# Patient Record
Sex: FEMALE | Race: WHITE | NOT HISPANIC OR LATINO | Employment: OTHER | URBAN - METROPOLITAN AREA
[De-identification: names, ages, dates, MRNs, and addresses within clinical notes are randomized per-mention and may not be internally consistent; named-entity substitution may affect disease eponyms.]

---

## 2017-04-22 ENCOUNTER — HOSPITAL ENCOUNTER (OUTPATIENT)
Dept: SLEEP MEDICINE | Facility: HOSPITAL | Age: 67
Discharge: HOME | End: 2017-04-22
Attending: PSYCHIATRY & NEUROLOGY | Admitting: PSYCHIATRY & NEUROLOGY

## 2017-05-04 ENCOUNTER — HOSPITAL ENCOUNTER (OUTPATIENT)
Dept: SLEEP MEDICINE | Facility: HOSPITAL | Age: 67
Discharge: HOME | End: 2017-05-04
Attending: PSYCHIATRY & NEUROLOGY | Admitting: PSYCHIATRY & NEUROLOGY

## 2017-06-08 ENCOUNTER — HOSPITAL ENCOUNTER (OUTPATIENT)
Dept: SLEEP MEDICINE | Facility: HOSPITAL | Age: 67
Discharge: HOME | End: 2017-06-08
Attending: PSYCHIATRY & NEUROLOGY | Admitting: PSYCHIATRY & NEUROLOGY

## 2017-07-24 ENCOUNTER — HOSPITAL ENCOUNTER (OUTPATIENT)
Dept: GENERAL RADIOLOGY | Facility: HOSPITAL | Age: 67
Discharge: HOME | End: 2017-07-24
Attending: OTOLARYNGOLOGY | Admitting: OTOLARYNGOLOGY

## 2019-07-24 RX ORDER — DIGOXIN 250 UG/1
TABLET ORAL
Qty: 30 TABLET | Refills: 5 | Status: SHIPPED | OUTPATIENT
Start: 2019-07-24 | End: 2020-01-27

## 2019-09-17 ENCOUNTER — OFFICE VISIT (OUTPATIENT)
Dept: CARDIOLOGY | Facility: CLINIC | Age: 69
End: 2019-09-17

## 2019-09-17 VITALS
HEART RATE: 69 BPM | WEIGHT: 286 LBS | HEIGHT: 69 IN | DIASTOLIC BLOOD PRESSURE: 89 MMHG | BODY MASS INDEX: 42.36 KG/M2 | SYSTOLIC BLOOD PRESSURE: 133 MMHG | OXYGEN SATURATION: 98 %

## 2019-09-17 DIAGNOSIS — Z79.01 ANTICOAGULATION MANAGEMENT ENCOUNTER: ICD-10-CM

## 2019-09-17 DIAGNOSIS — I48.3 TYPICAL ATRIAL FLUTTER (HCC): Primary | ICD-10-CM

## 2019-09-17 DIAGNOSIS — Z51.81 ANTICOAGULATION MANAGEMENT ENCOUNTER: ICD-10-CM

## 2019-09-17 DIAGNOSIS — I10 ESSENTIAL HYPERTENSION: ICD-10-CM

## 2019-09-17 DIAGNOSIS — Z01.810 PREOPERATIVE CARDIOVASCULAR EXAMINATION: ICD-10-CM

## 2019-09-17 PROBLEM — IMO0002 EXCESSIVE ANTICOAGULATION: Status: ACTIVE | Noted: 2018-08-21

## 2019-09-17 PROCEDURE — 93000 ELECTROCARDIOGRAM COMPLETE: CPT | Performed by: INTERNAL MEDICINE

## 2019-09-17 PROCEDURE — 99214 OFFICE O/P EST MOD 30 MIN: CPT | Performed by: INTERNAL MEDICINE

## 2019-09-17 RX ORDER — BUPROPION HYDROCHLORIDE 300 MG/1
300 TABLET ORAL EVERY MORNING
COMMUNITY
Start: 2019-08-04 | End: 2023-03-07 | Stop reason: ALTCHOICE

## 2019-09-17 RX ORDER — RIVAROXABAN 20 MG/1
TABLET, FILM COATED ORAL
COMMUNITY
Start: 2019-06-24 | End: 2019-09-17 | Stop reason: SDUPTHER

## 2019-09-17 RX ORDER — BUDESONIDE AND FORMOTEROL FUMARATE DIHYDRATE 160; 4.5 UG/1; UG/1
2 AEROSOL RESPIRATORY (INHALATION) 2 TIMES DAILY PRN
COMMUNITY
Start: 2017-09-19 | End: 2022-02-24

## 2019-09-17 RX ORDER — PROPAFENONE HYDROCHLORIDE 225 MG/1
TABLET, FILM COATED ORAL EVERY 12 HOURS
COMMUNITY
Start: 2018-01-17 | End: 2021-05-19

## 2019-09-17 RX ORDER — ASPIRIN 81 MG/1
81 TABLET ORAL DAILY
COMMUNITY
Start: 2017-06-19 | End: 2021-06-21

## 2019-09-17 RX ORDER — VENLAFAXINE HYDROCHLORIDE 75 MG/1
225 CAPSULE, EXTENDED RELEASE ORAL DAILY
COMMUNITY
Start: 2019-08-04

## 2019-09-17 RX ORDER — RIVAROXABAN 20 MG/1
20 TABLET, FILM COATED ORAL
Qty: 28 TABLET | Refills: 0 | COMMUNITY
Start: 2019-09-17 | End: 2020-02-28

## 2019-09-17 NOTE — PROGRESS NOTES
Encounter Date:09/17/2019      Patient ID: Rosanne Hutchins is a 68 y.o. female.    Chief Complaint:  Preoperative cardiovascular evaluation prior to having possible lipoma removal.  Atrial fibrillation  Hypertension  Anticoagulation management.      History of Present Illness    Patient has a lipoma on the back of the neck.  Patient is considering having lipoma removal.    Since I have last seen, the patient has been without any chest discomfort ,shortness of breath, palpitations, dizziness or syncope.  Denies having any headache ,abdominal pain ,nausea, vomiting , diarrhea constipation, loss of weight or loss of appetite.  Denies having any excessive bruising ,hematuria or blood in the stool.    Review of all systems negative except as indicated  Assessment and Plan       /////////////////   impression  ===============   -Intermittent atrial fibrillation.  patient is asymptomatic.  Patient does not feel like she is in atrial fibrillation   Patient has converted and Was maintaining sinus rhythm in the past.Status post attempted cardioversion 04/15/2016.  Patient would convert but would not stay in sinus rhythm.  Patient is in atrial fibrillation today.    -anticoagulation - patient is on Xarelto      -hypertension-well controlled    - sleep apnea on BiPAP     -history of mild elevation of creatinine at 1. 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     -exogenous obesity.  Patient has lost significant weight    - post hysterectomy and bilateral salpingo-oophorectomy.  ==================   Plan  ============  EKG showed atrial fibrillation with controlled ventricular response   patient is asymptomatic .    Would  not make attempts to convert since patient is  asymptomatic.  I have briefly discussed with her about ablation however I did not think it is necessary at this time since patient is asymptomatic with atrial fibrillation and is well anticoagulated.  At this point I did not see any role for Watchman  procedure  Patient was asked to continue Rythmol to 225 mg   Twice a day and continue Xarelto 20 mg a day.  Continue Lanoxin.  Anticoagulation status was reviewed.    Continue Xarelto   Medications were reviewed and updated.  Okay with plan lipoma surgery if necessary.  Xarelto can be held for 4 days prior to the procedure.  Patient was provided with supporting document  Followup in the office in 6 months with EKG.  ////////////////////////           Diagnosis Plan   1. Typical atrial flutter (CMS/HCC)  ECG 12 Lead   2. Essential hypertension  ECG 12 Lead   3. Anticoagulation management encounter  ECG 12 Lead   4. Preoperative cardiovascular examination  ECG 12 Lead   LAB RESULTS (LAST 7 DAYS)    CBC        BMP        CMP         BNP        TROPONIN        CoAg        Creatinine Clearance  CrCl cannot be calculated (No order found.).    ABG        Radiology  No radiology results for the last day                The following portions of the patient's history were reviewed and updated as appropriate: allergies, current medications, past family history, past medical history, past social history, past surgical history and problem list.    Review of Systems   Constitution: Negative for malaise/fatigue.   Cardiovascular: Negative for chest pain, leg swelling, palpitations and syncope.   Respiratory: Negative for shortness of breath.    Skin: Negative for rash.   Gastrointestinal: Negative for nausea and vomiting.   Neurological: Positive for dizziness. Negative for light-headedness and numbness.         Current Outpatient Medications:   •  aspirin (ASPIR-LOW) 81 MG EC tablet, ASPIR-LOW 81 MG TBEC, Disp: , Rfl:   •  budesonide-formoterol (SYMBICORT) 160-4.5 MCG/ACT inhaler, SYMBICORT 160-4.5 MCG/ACT AERO, Disp: , Rfl:   •  buPROPion XL (WELLBUTRIN XL) 300 MG 24 hr tablet, , Disp: , Rfl:   •  DIGOX 250 MCG tablet, TAKE ONE TABLET BY MOUTH DAILY, Disp: 30 tablet, Rfl: 5  •  propafenone (RYTHMOL) 225 MG tablet, Every 12  "(Twelve) Hours., Disp: , Rfl:   •  venlafaxine XR (EFFEXOR-XR) 75 MG 24 hr capsule, , Disp: , Rfl:   •  XARELTO 20 MG tablet, Take 1 tablet by mouth Daily With Dinner., Disp: 28 tablet, Rfl: 0    No Known Allergies    Family History   Problem Relation Age of Onset   • Diabetes Mother    • Hypertension Mother    • Heart disease Mother    • Diabetes Father    • Stroke Paternal Grandmother        Past Surgical History:   Procedure Laterality Date   • ANKLE SURGERY Right    • CARDIOVERSION  04/15/2016    Unsuccessful    • SHOULDER SURGERY Left    • TONSILLECTOMY  1956   • TOTAL ABDOMINAL HYSTERECTOMY  10/18/2016       Past Medical History:   Diagnosis Date   • Atrial fibrillation (CMS/HCC)    • Hypertension    • Sleep apnea        Family History   Problem Relation Age of Onset   • Diabetes Mother    • Hypertension Mother    • Heart disease Mother    • Diabetes Father    • Stroke Paternal Grandmother        Social History     Socioeconomic History   • Marital status:      Spouse name: Not on file   • Number of children: Not on file   • Years of education: Not on file   • Highest education level: Not on file   Tobacco Use   • Smoking status: Never Smoker           ECG 12 Lead  Date/Time: 9/17/2019 2:39 PM  Performed by: Wade Hunter MD  Authorized by: Wade Hunter MD   Comparison: compared with previous ECG   Comments: Atrial fibrillation with controlled ventricular response 66/min nonspecific ST-T wave changes normal axis normal intervals no ectopy.  No change from 2/28/2019              Objective:       Physical Exam    /89 (BP Location: Right arm, Patient Position: Sitting, Cuff Size: Adult) Comment (BP Location): wrist  Pulse 69   Ht 175.3 cm (69\")   Wt 130 kg (286 lb)   SpO2 98%   BMI 42.23 kg/m²   The patient is alert, oriented and in no distress.    Vital signs as noted above.    Head and neck revealed no carotid bruits or jugular venous distension.  No thyromegaly or lymphadenopathy is " present.  Large lipoma on the back of the neck.    Lungs clear.  No wheezing.  Breath sounds are normal bilaterally.    Heart normal first and second heart sounds.  No murmur..  No pericardial rub is present.  No gallop is present.    Abdomen soft and nontender.  No organomegaly is present.    Extremities revealed good peripheral pulses without any pedal edema.    Skin warm and dry.    Musculoskeletal system is grossly normal.    CNS grossly normal.

## 2020-01-27 RX ORDER — DIGOXIN 250 MCG
TABLET ORAL
Qty: 90 TABLET | Refills: 4 | Status: SHIPPED | OUTPATIENT
Start: 2020-01-27 | End: 2021-04-29

## 2020-02-28 RX ORDER — RIVAROXABAN 20 MG/1
TABLET, FILM COATED ORAL
Qty: 30 TABLET | Refills: 7 | Status: SHIPPED | OUTPATIENT
Start: 2020-02-28 | End: 2020-10-22

## 2020-10-22 RX ORDER — RIVAROXABAN 20 MG/1
TABLET, FILM COATED ORAL
Qty: 30 TABLET | Refills: 6 | Status: SHIPPED | OUTPATIENT
Start: 2020-10-22 | End: 2021-04-29

## 2020-11-11 ENCOUNTER — LAB (OUTPATIENT)
Dept: LAB | Facility: HOSPITAL | Age: 70
End: 2020-11-11

## 2020-11-11 DIAGNOSIS — Z13.9 ENCOUNTER FOR HEALTH-RELATED SCREENING: ICD-10-CM

## 2020-11-11 DIAGNOSIS — Z13.9 ENCOUNTER FOR HEALTH-RELATED SCREENING: Primary | ICD-10-CM

## 2020-11-11 PROCEDURE — C9803 HOPD COVID-19 SPEC COLLECT: HCPCS

## 2020-11-11 PROCEDURE — U0004 COV-19 TEST NON-CDC HGH THRU: HCPCS

## 2020-11-12 LAB — SARS-COV-2 RNA RESP QL NAA+PROBE: NOT DETECTED

## 2021-04-28 ENCOUNTER — TRANSCRIBE ORDERS (OUTPATIENT)
Dept: PHYSICAL THERAPY | Facility: CLINIC | Age: 71
End: 2021-04-28

## 2021-04-28 DIAGNOSIS — M17.12 UNILATERAL PRIMARY OSTEOARTHRITIS, LEFT KNEE: Primary | ICD-10-CM

## 2021-04-29 RX ORDER — RIVAROXABAN 20 MG/1
TABLET, FILM COATED ORAL
Qty: 90 TABLET | Refills: 5 | Status: SHIPPED | OUTPATIENT
Start: 2021-04-29 | End: 2021-06-21

## 2021-04-29 RX ORDER — DIGOXIN 250 MCG
TABLET ORAL
Qty: 90 TABLET | Refills: 3 | Status: SHIPPED | OUTPATIENT
Start: 2021-04-29 | End: 2021-05-19

## 2021-05-13 ENCOUNTER — TREATMENT (OUTPATIENT)
Dept: PHYSICAL THERAPY | Facility: CLINIC | Age: 71
End: 2021-05-13

## 2021-05-13 DIAGNOSIS — M17.12 OSTEOARTHRITIS OF LEFT KNEE, UNSPECIFIED OSTEOARTHRITIS TYPE: Primary | ICD-10-CM

## 2021-05-13 PROCEDURE — 97110 THERAPEUTIC EXERCISES: CPT | Performed by: PHYSICAL THERAPIST

## 2021-05-13 PROCEDURE — 97161 PT EVAL LOW COMPLEX 20 MIN: CPT | Performed by: PHYSICAL THERAPIST

## 2021-05-13 NOTE — PROGRESS NOTES
Physical Therapy Initial Evaluation and Plan of Care    Patient: Rosanne Hutchins   : 1950  Diagnosis/ICD-10 Code:  Osteoarthritis of left knee, unspecified osteoarthritis type [M17.12]  Referring practitioner: Jean Stockton MD  Date of Initial Visit: 2021  Today's Date: 2021  Patient seen for 1 sessions           Subjective Questionnaire: LEFS: 34/80 =4 2.5% ability      Subjective Evaluation    History of Present Illness  Mechanism of injury: Reports Left knee pain started about 3 months ago, it gradually came on. Was getting worse before steroid injection. Injection has helped.   Today fell in the shower, feels all banged up. Patient slipped while in the tub, fell over the side... Plans on stopping at [employer] to buy a bath mat. From today's fall has R thumb/hand pain.   Denies trouble sleeping due to Knee pain.   Would like to loose weight, obese, sleep apnea and depression.   Hx, afib can only take Tylenol      Subjective comment: 69 y/o w/f ref. with Left knee pain OA. Had steroid injection in knee 2 weeks ago.   Patient Occupation: retired, hobbies reading, used to love to walk Pain  Current pain ratin (L lateral knee pain in sitting, right now in sitting.)  At best pain ratin  At worst pain ratin (at the end of the dayn after having been up on it longer. )  Quality: dull ache (stabbing when it gets intense)  Relieving factors: rest (elevation)  Exacerbated by: standing , walking longer.     Social Support  Lives in: multiple-level home (stair has rails, laundry in basement, does step too)  Lives with: alone    Hand dominance: right    Diagnostic Tests  X-ray: abnormal    Treatments  Previous treatment: injection treatment  Patient Goals  Patient goals for therapy: decreased edema, decreased pain, improved balance, increased motion, increased strength, independence with ADLs/IADLs and return to sport/leisure activities  Patient goal: Have better mobility and ability to travel  to visit son in [place]           Objective          Active Range of Motion   Left Knee   Flexion: 92 degrees with pain  Extension: with pain  Extensor la degrees     Right Knee   Flexion: 103 degrees   Extension: 0 degrees     Additional Active Range of Motion Details  MMT hip flex , knee ext/flex /foot Df/PF R all 5/5 left 5/5 except  Left knee ext 4/5, ext lag with SLR.   Difficulty with sit to stand due to Left knee pain. Walks hunched forward. Waddling gait due to obesity.  Started on HEP. See below. Patient declined ice and ES after. did not feel worse after.  Discussed starting walking program start with 5 to 10 min 3 x day till able to do 30 min at once daily.    Access Code: [email or web address]  URL: [email or web address]  Date: 2021  Prepared by: Kat Cherry    Exercises  Supine Ankle Pumps - 3 x daily - 7 x weekly - 10 reps - 3 sets  Quad Setting and Stretching - 3 x daily - 7 x weekly - 10 reps - 1 sets - 5 sec hold  Supine Heel Slide - 3 x daily - 7 x weekly - 10 reps - 1 sets  Supine Short Arc Quad - 3 x daily - 7 x weekly - 10 reps - 1 sets - 5 sec hold  Supine Active Straight Leg Raise - 3 x daily - 7 x weekly - 10 reps - 1 sets  Supine Hip Abduction - 3 x daily - 7 x weekly - 10 reps - 1 sets  Seated Long Arc Quad - 3 x daily - 7 x weekly - 10 reps - 1 sets      Swelling     Left Knee Girth Measurement (cm)   Joint line: 59 (58. 5) cm    Right Knee Girth Measurement (cm)   Joint line: 56 (cm) cm          Assessment & Plan     Assessment  Impairments: abnormal gait, abnormal or restricted ROM, activity intolerance, impaired balance, impaired physical strength, lacks appropriate home exercise program, pain with function and weight-bearing intolerance  Assessment details: 71 y/o w/f with worsening Left OA knee pain, better after steroid injection. Impaired gait, ROM, strength function, also obese wanting to loose weight and get in shape so she can travel.  Barriers to therapy: hx afib, obese,  HTN... see Epic  Prognosis: good  Functional Limitations: lifting, walking, uncomfortable because of pain, standing, stooping and unable to perform repetitive tasks  Goals  Plan Goals: Pt presents to PT with symptoms consistent with Left knee pain.  Pt would benefit from skilled PT intervention to address the deficits noted.       SHORT TERM GOALS: Time for Goal Achievement:  6 visits    1.  Patient to be compliant with  Initial HEP.   2.  Pt able to ascend/descend steps and transfer with less knee pain < 5/10  3.  Pt can tolerate 10 min warm up on Nustep and full revolutions on bike.  4.  Pt. to exhibit increased LE endurance/strength to 4+/5 to allow for increased ease with sit-stand and standing/walking > 30 minutes.    LONG TERM GOALS: Time for Goal Achievement: 12 visits  1.  Pt to have significant improvement on perceived disability score/outcome measure.  2.  Patient able to ascend/descend steps and prolonged standing with pain < 2/10.  3.  Pt to exhibit full knee AROM to allow for kneeling, bending squatting as is necessary for ADL's, IADL's and household activities.   4.  Pt to exhibit LE endurance/strength to 5/5 to allow for walking, steps and transfers to occur safely.  5.  Pt to demonstrate increased stability of the knee to balance on foam as needed to traverse uneven terrain .          Plan  Therapy options: will be seen for skilled physical therapy services  Planned modality interventions: cryotherapy, electrical stimulation/Russian stimulation, TENS, ultrasound and thermotherapy (hydrocollator packs)  Other planned modality interventions: Aquatic  Planned therapy interventions: flexibility, functional ROM exercises, gait training, home exercise program, joint mobilization, therapeutic activities, stretching, strengthening, soft tissue mobilization, orthotic fitting/training, neuromuscular re-education and manual therapy  Frequency: 2x week  Duration in visits: 12  Treatment plan discussed with:  patient        Timed:         Manual Therapy:         mins  25852;     Therapeutic Exercise:    20     mins  96446;     Neuromuscular Nguyễn:        mins  08941;    Therapeutic Activity:          mins  45003;     Gait Training:           mins  38838;     Ultrasound:          mins  81180;    Ionto                                   mins   68845  Self Care                            mins   31747  Aquatic                               mins 56297      Un-Timed:  Electrical Stimulation:         mins  91701 ( );  Dry Needling          mins self-pay  Traction          mins 81987  Low Eval     25     Mins  50666  Mod Eval          Mins  63149  High Eval                            Mins  79047  Re-Eval                               mins  54627        Timed Treatment:   20   mins   Total Treatment:     45   mins    PT SIGNATURE: Kat Cherry, GUICHO   DATE TREATMENT INITIATED: 5/13/2021    Medicare Initial Certification  Certification Period: 8/11/2021  I certify that the therapy services are furnished while this patient is under my care.  The services outlined above are required by this patient, and will be reviewed every 90 days.     PHYSICIAN: Jean Stockton MD      DATE:     Please sign and return via fax to [phone].. Thank you, [facility].

## 2021-05-14 ENCOUNTER — TREATMENT (OUTPATIENT)
Dept: PHYSICAL THERAPY | Facility: CLINIC | Age: 71
End: 2021-05-14

## 2021-05-14 DIAGNOSIS — M17.12 OSTEOARTHRITIS OF LEFT KNEE, UNSPECIFIED OSTEOARTHRITIS TYPE: Primary | ICD-10-CM

## 2021-05-14 PROCEDURE — 97116 GAIT TRAINING THERAPY: CPT | Performed by: PHYSICAL THERAPIST

## 2021-05-14 PROCEDURE — 97110 THERAPEUTIC EXERCISES: CPT | Performed by: PHYSICAL THERAPIST

## 2021-05-14 NOTE — PROGRESS NOTES
Physical Therapy Daily Progress Note  VISIT#: 2 POC 12    Subjective   Rosanne Hutchins reports: Has some muscle soreness from yesterday eval and back a little sore last night. No actual knee pain today. Did walk 10 minutes last night.   She was newly diagnosed with DM and has balance problems.      Objective     See Exercise, Manual, and Modality Logs for complete treatment.     Patient Education: review/practiced HEP Warmup on bike for bending and Nustep for straightening knees.  6 min walk test: 3 standing rests, did 780 ft, mild SOA, mask did not help.   Added steps ups, sit to stand and up on toes, heels to program today.     Assessment/Plan  Patient did report mild Left knee pain during step ups but declined ice after. Will ice at home if she needs it. Reminded to do some more walking later today.     Progress per Plan of Care and Progress strengthening /stabilization /functional activity  As able to tolerate.          Timed:         Manual Therapy:         mins  19170;     Therapeutic Exercise:   30      mins  00227;     Neuromuscular Nguyễn:        mins  94743;    Therapeutic Activity:          mins  73452;     Gait Training:      15     mins  55129;     Ultrasound:          mins  41577;    Ionto                                   mins   17479  Self Care                            mins   15124  Canalith Repos                   mins  4209  Aquatic                               mins 34587    Un-Timed:  Electrical Stimulation:         mins  21132 ( );  Dry Needling          mins self-pay  Traction          mins 44693    Timed Treatment:  45    mins   Total Treatment:    45    mins    Kat Cherry PT  [place] License # [id number]  Physical Therapist

## 2021-05-17 ENCOUNTER — TREATMENT (OUTPATIENT)
Dept: PHYSICAL THERAPY | Facility: CLINIC | Age: 71
End: 2021-05-17

## 2021-05-17 DIAGNOSIS — M17.12 OSTEOARTHRITIS OF LEFT KNEE, UNSPECIFIED OSTEOARTHRITIS TYPE: Primary | ICD-10-CM

## 2021-05-17 PROCEDURE — 97110 THERAPEUTIC EXERCISES: CPT | Performed by: PHYSICAL THERAPIST

## 2021-05-17 PROCEDURE — 97116 GAIT TRAINING THERAPY: CPT | Performed by: PHYSICAL THERAPIST

## 2021-05-17 NOTE — PROGRESS NOTES
Physical Therapy Daily Progress Note  VISIT#: 3 POC 12    Subjective     Rosanne Hutchins reports: was sore from fall the other day but not extra sore from last PT session.     Objective       See Exercise, Manual, and Modality Logs for complete treatment.     Patient Education: review/practiced HEP Warmup on bike for bending and Nustep for straightening knees.  Interval walking: worked on improved posture during and looking up.    Access Code: [email or web address]  URL: [email or web address]  Date: 05/17/2021  Prepared by: Kat Cherry    Exercises  Supine Active Straight Leg Raise - 1 x daily - 7 x weekly - 2 sets - 10 reps  Sidelying Hip Abduction - 1 x daily - 7 x weekly - 2 sets - 10 reps  Sidelying Hip Adduction with Chair - 1 x daily - 7 x weekly - 2 sets - 10 reps  Prone Hip Extension - 1 x daily - 7 x weekly - 2 sets - 10 reps  Supine Hip Adduction Isometric with Ball - 1 x daily - 7 x weekly - 2 sets - 10 reps  Hooklying Isometric Hip Abduction with Belt - 1 x daily - 7 x weekly - 2 sets - 10 reps    Added 4 way SLR hip, ball & belt  To HEP.    Assessment/Plan    Patient did report mild Left knee & back pain during SLR, better when other leg bent. Will ice at home if she needs it. Reminded to do some more walking later today.     Progress per Plan of Care and Progress strengthening /stabilization /functional activity  As able to tolerate.          Timed:         Manual Therapy:         mins  94280;     Therapeutic Exercise:   35      mins  64078;     Neuromuscular Nguyễn:        mins  96987;    Therapeutic Activity:          mins  58449;     Gait Training:      10     mins  73816;     Ultrasound:          mins  19730;    Ionto                                   mins   56884  Self Care                            mins   52422  Canalith Repos                   mins  4209  Aquatic                               mins 03595    Un-Timed:  Electrical Stimulation:         mins  82351 ( );  Dry Needling          mins  self-pay  Traction          mins 52439    Timed Treatment:  45    mins   Total Treatment:    45    mins    Kat Cherry, PT  [place] License # [id number]  Physical Therapist

## 2021-05-19 ENCOUNTER — OFFICE VISIT (OUTPATIENT)
Dept: CARDIOLOGY | Facility: CLINIC | Age: 71
End: 2021-05-19

## 2021-05-19 VITALS
WEIGHT: 293 LBS | BODY MASS INDEX: 44.01 KG/M2 | HEART RATE: 79 BPM | OXYGEN SATURATION: 98 % | SYSTOLIC BLOOD PRESSURE: 170 MMHG | DIASTOLIC BLOOD PRESSURE: 77 MMHG

## 2021-05-19 DIAGNOSIS — Z01.810 PREOPERATIVE CARDIOVASCULAR EXAMINATION: ICD-10-CM

## 2021-05-19 DIAGNOSIS — E78.2 MIXED HYPERLIPIDEMIA: ICD-10-CM

## 2021-05-19 DIAGNOSIS — I48.3 TYPICAL ATRIAL FLUTTER (HCC): ICD-10-CM

## 2021-05-19 DIAGNOSIS — Z79.01 ANTICOAGULATION MANAGEMENT ENCOUNTER: ICD-10-CM

## 2021-05-19 DIAGNOSIS — Z51.81 ANTICOAGULATION MANAGEMENT ENCOUNTER: ICD-10-CM

## 2021-05-19 DIAGNOSIS — I10 ESSENTIAL HYPERTENSION: ICD-10-CM

## 2021-05-19 DIAGNOSIS — I48.11 LONGSTANDING PERSISTENT ATRIAL FIBRILLATION (HCC): Primary | ICD-10-CM

## 2021-05-19 PROCEDURE — 93000 ELECTROCARDIOGRAM COMPLETE: CPT | Performed by: INTERNAL MEDICINE

## 2021-05-19 PROCEDURE — 99214 OFFICE O/P EST MOD 30 MIN: CPT | Performed by: INTERNAL MEDICINE

## 2021-05-19 RX ORDER — METOPROLOL SUCCINATE 25 MG/1
25 TABLET, EXTENDED RELEASE ORAL DAILY
Qty: 90 TABLET | Refills: 3 | Status: SHIPPED | OUTPATIENT
Start: 2021-05-19 | End: 2022-04-25

## 2021-05-19 NOTE — PROGRESS NOTES
Cardiology Office Visit      Encounter Date:  05/19/2021    Patient ID:   Rosanne Hutchins is a 70 y.o. female.    Reason For Followup:  Chronic atrial fibrillation  Hypertension    Brief Clinical History:  Dear Tabby Reilly MD    I had the pleasure of seeing Rosanne Hutchins today. As you are well aware, this is a 70 y.o. female past medical history that is significant for history of hypertension obesity and also history of obstructive sleep apnea currently not on CPAP and also prior history of atrial fibrillation currently on anticoagulation therapy here to establish cardiology care    Interval History:  Patient has been compliant with medical therapy with anticoagulation therapy and also digoxin for rate control  Patient was treated with sotalol and also Rythmol in the past with not being able to maintain the sinus rhythm  She was attempted cardioversion in the past  Currently on no rate control and anticoagulation therapy with no significant symptoms from the A. fib standpoint  Patient does have some shortness of breath and dyspnea on exertion with no recent ischemic evaluation or cardiac work-up  Recent labs show normal chemistries and digoxin level at the higher end of normal subspectrum with a digoxin level of 2  Lipids are reasonable  Thyroid function was normal    Assessment & Plan    Impressions:  Chronic atrial fibrillation  Hypertension  Obesity/Body mass index is 44.01 kg/m².   Obstructive sleep apnea currently not using the CPAP  Hyperlipidemia  Recent diagnosis of diabetes mellitus with hemoglobin A1c of 7.6    Recommendations:  Agree with aggressive risk factor modification regular exercise and weight loss  Discontinue digoxin and start patient on Toprol-XL 25 mg p.o. once a day for rate control  Continue anticoagulation therapy with Xarelto  Risk benefits and alternatives for long-term oral anticoagulation reviewed and discussed with the patient  Likely will benefit from ischemic evaluation at this  time  Schedule for an echocardiogram to assess LV systolic function rule out significant valve pathology  Schedule patient for exercise Cardiolite stress test for further stratification and ischemic evaluation  Follow-up in office in 3 months  Prior work-up and labs reviewed and discussed with patient  Objective:    Vitals:  Vitals:    05/19/21 1408   BP: 170/77   Pulse: 79   SpO2: 98%   Weight: 135 kg (298 lb)       Physical Exam:    General: Alert, cooperative, no distress, appears stated age  Head:  Normocephalic, atraumatic, mucous membranes moist  Eyes:  Conjunctiva/corneas clear, EOM's intact     Neck:  Supple,  no adenopathy;      Lungs: Clear to auscultation bilaterally, no wheezes rhonchi rales are noted  Chest wall: No tenderness  Heart::  irregular rate and rhythm, S1 and S2 normal, no murmur, rub or gallop  Abdomen: Soft, non-tender, nondistended bowel sounds active  Extremities: No cyanosis, clubbing, or edema  Pulses: 2+ and symmetric all extremities  Skin:  No rashes or lesions  Neuro/psych: A&O x3. CN II through XII are grossly intact with appropriate affect      Allergies:  No Known Allergies    Medication Review:     Current Outpatient Medications:   •  aspirin (ASPIR-LOW) 81 MG EC tablet, ASPIR-LOW 81 MG TBEC, Disp: , Rfl:   •  buPROPion XL (WELLBUTRIN XL) 300 MG 24 hr tablet, , Disp: , Rfl:   •  digoxin (LANOXIN) 250 MCG tablet, TAKE ONE TABLET BY MOUTH DAILY, Disp: 90 tablet, Rfl: 3  •  venlafaxine XR (EFFEXOR-XR) 75 MG 24 hr capsule, , Disp: , Rfl:   •  Xarelto 20 MG tablet, TAKE ONE TABLET BY MOUTH EVERY MORNING, Disp: 90 tablet, Rfl: 5  •  budesonide-formoterol (SYMBICORT) 160-4.5 MCG/ACT inhaler, SYMBICORT 160-4.5 MCG/ACT AERO, Disp: , Rfl:   •  propafenone (RYTHMOL) 225 MG tablet, Every 12 (Twelve) Hours., Disp: , Rfl:     Family History:  Family History   Problem Relation Age of Onset   • Diabetes Mother    • Hypertension Mother    • Heart disease Mother    • Diabetes Father    • Stroke  Paternal Grandmother        Past Medical History:  Past Medical History:   Diagnosis Date   • Atrial fibrillation (CMS/HCC)    • Hypertension    • Sleep apnea        Past surgical History:  Past Surgical History:   Procedure Laterality Date   • ANKLE SURGERY Right    • CARDIOVERSION  04/15/2016    Unsuccessful    • SHOULDER SURGERY Left    • TONSILLECTOMY  1956   • TOTAL ABDOMINAL HYSTERECTOMY  10/18/2016       Social History:  Social History     Socioeconomic History   • Marital status:      Spouse name: Not on file   • Number of children: Not on file   • Years of education: Not on file   • Highest education level: Not on file   Tobacco Use   • Smoking status: Never Smoker   • Smokeless tobacco: Never Used   Substance and Sexual Activity   • Alcohol use: Yes   • Drug use: Never       Review of Systems:  The following systems were reviewed as they relate to the cardiovascular system: Constitutional, Eyes, ENT, Cardiovascular, Respiratory, Gastrointestinal, Integumentary, Neurological, Psychiatric, Hematologic, Endocrine, Musculoskeletal, and Genitourinary. The pertinent cardiovascular findings are reported above with all other cardiovascular points within those systems being negative.    Diagnostic Study Review:     Current Electrocardiogram:    ECG 12 Lead    Date/Time: 5/19/2021 4:20 PM  Performed by: Marnie Ramsay MD  Authorized by: Marnie Ramsay MD   Rhythm: atrial fibrillation  Rate: normal  BPM: 79  Conduction: conduction normal  QRS axis: normal  Other findings: non-specific ST-T wave changes    Clinical impression: abnormal EKG              NOTE: The following portions of the patient's history were reviewed and updated this visit as appropriate: allergies, current medications, past family history, past medical history, past social history, past surgical history and problem list.

## 2021-05-20 ENCOUNTER — TREATMENT (OUTPATIENT)
Dept: PHYSICAL THERAPY | Facility: CLINIC | Age: 71
End: 2021-05-20

## 2021-05-20 DIAGNOSIS — M17.12 OSTEOARTHRITIS OF LEFT KNEE, UNSPECIFIED OSTEOARTHRITIS TYPE: Primary | ICD-10-CM

## 2021-05-20 PROCEDURE — 97110 THERAPEUTIC EXERCISES: CPT | Performed by: PHYSICAL THERAPIST

## 2021-05-20 PROCEDURE — 97116 GAIT TRAINING THERAPY: CPT | Performed by: PHYSICAL THERAPIST

## 2021-05-20 NOTE — PROGRESS NOTES
Physical Therapy Daily Progress Note  VISIT#: 4 POC 12    Subjective     Rosanne Hutchins reports: came in with no pain today. Has done some walking interval and some steps already today.     Objective       See Exercise, Manual, and Modality Logs for complete treatment.     Patient Education: review/practiced HEP Warmup on bike for bending and Nustep for straightening knees.  Interval walking: worked on improved posture during and looking up.    Access Code: [email or web address]  URL: [email or web address]  Date: 05/20/2021  Prepared by: Kat Cherry    Exercises  Standing Lumbar Extension - 2 x daily - 7 x weekly - 10 reps - 1 sets  Seated Lumbar Extension - 2 x daily - 7 x weekly - 10 reps - 1 sets  Sit to Stand with Hands on Knees - 2 x daily - 7 x weekly - 1 sets - 10 reps  Step Up - 1 x daily - 7 x weekly - 2 sets - 10 reps  Lateral Step Up - 1 x daily - 7 x weekly - 2 sets - 10 reps  Seated March - 1 x daily - 7 x weekly - 2 sets - 10 reps      Assessment/Plan    Patient did report mild Left knee pain at end of session but not enough to need ice before she went home. Will ice at home if she needs it.   Reminded patient to keep up with interval walking.     Progress per Plan of Care and Progress strengthening /stabilization /functional activity  As able to tolerate.          Timed:         Manual Therapy:         mins  71878;     Therapeutic Exercise:   35      mins  91137;     Neuromuscular Nguyễn:        mins  84594;    Therapeutic Activity:          mins  21076;     Gait Training:      10     mins  01248;     Ultrasound:          mins  31296;    Ionto                                   mins   98067  Self Care                            mins   17696  Canalith Repos                   mins  4209  Aquatic                               mins 97262    Un-Timed:  Electrical Stimulation:         mins  20522 ( );  Dry Needling          mins self-pay  Traction          mins 68770    Timed Treatment:  45    mins   Total  Treatment:    45    mins    Kat Cherry, PT  [place] License # [id number]  Physical Therapist

## 2021-05-28 ENCOUNTER — TREATMENT (OUTPATIENT)
Dept: PHYSICAL THERAPY | Facility: CLINIC | Age: 71
End: 2021-05-28

## 2021-05-28 DIAGNOSIS — M17.12 OSTEOARTHRITIS OF LEFT KNEE, UNSPECIFIED OSTEOARTHRITIS TYPE: Primary | ICD-10-CM

## 2021-05-28 PROCEDURE — 97110 THERAPEUTIC EXERCISES: CPT | Performed by: PHYSICAL THERAPIST

## 2021-05-28 PROCEDURE — 97116 GAIT TRAINING THERAPY: CPT | Performed by: PHYSICAL THERAPIST

## 2021-05-28 NOTE — PROGRESS NOTES
"Physical Therapy Daily Progress Note  VISIT#: 5 POC 12    Subjective     Rosanne Hutchins reports: came in with no pain today. Has done some interval walking but is \"still pooping out.     Objective       See Exercise, Manual, and Modality Logs for complete treatment.     Patient Education: review/practiced HEP Warmup on bike for bending and Nustep for straightening knees.  Interval walking: worked on improved posture during and looking up.        Assessment/Plan    Patient  Still gets SOA, needs rest brakes.  Reminded patient to keep up with interval walking.     Progress per Plan of Care and Progress strengthening /stabilization /functional activity  As able to tolerate.          Timed:         Manual Therapy:         mins  45010;     Therapeutic Exercise:   35      mins  81942;     Neuromuscular Nguyễn:        mins  34705;    Therapeutic Activity:          mins  88976;     Gait Training:      10     mins  62075;     Ultrasound:          mins  40751;    Ionto                                   mins   31161  Self Care                            mins   45363  Canalith Repos                   mins  4209  Aquatic                               mins 57506    Un-Timed:  Electrical Stimulation:         mins  05063 ( );  Dry Needling          mins self-pay  Traction          mins 52043    Timed Treatment:  45    mins   Total Treatment:    45    mins    Kat Cherry PT  [place] License # [id number]  Physical Therapist  "

## 2021-06-01 ENCOUNTER — TREATMENT (OUTPATIENT)
Dept: PHYSICAL THERAPY | Facility: CLINIC | Age: 71
End: 2021-06-01

## 2021-06-01 DIAGNOSIS — M17.12 OSTEOARTHRITIS OF LEFT KNEE, UNSPECIFIED OSTEOARTHRITIS TYPE: Primary | ICD-10-CM

## 2021-06-01 PROCEDURE — 97116 GAIT TRAINING THERAPY: CPT | Performed by: PHYSICAL THERAPIST

## 2021-06-01 PROCEDURE — 97110 THERAPEUTIC EXERCISES: CPT | Performed by: PHYSICAL THERAPIST

## 2021-06-01 NOTE — PROGRESS NOTES
Physical Therapy Daily Progress Note  VISIT#: 6 POC 12    Subjective     Rosanne Hutchins reports: came in with no pain today. Has done some walking over the weekend but states she still fatigues quickly.      Objective       See Exercise, Manual, and Modality Logs for complete treatment.     Patient Education: review/practiced HEP, discussed wide base walk due to fear of falling. In the future work on make gait more narrow, make steps forward.     Warmup on bike for bending and Nustep for straightening knees.  Interval walking: worked on improved posture during and looking up, increased to 4 laps each.        Assessment/Plan    Patient still gets SOA, needs frequent rest brakes. Mask due to Covid precautions is not helping.  Reminded patient to keep up with interval walking.     SHORT TERM GOALS: Time for Goal Achievement:  6 visits    1.  Patient to be compliant with  Initial HEP - met  2.  Pt able to ascend/descend steps and transfer with less knee pain < 5/10 - met  3.  Pt can tolerate 10 min warm up on Nustep and full revolutions on bike - met  4.  Pt. to exhibit increased LE endurance/strength to 4+/5 to allow for increased ease with sit-stand and standing/walking > 30 minutes - making progress    LONG TERM GOALS: Time for Goal Achievement: 12 visits  1.  Pt to have significant improvement on perceived disability score/outcome measure.  2.  Patient able to ascend/descend steps and prolonged standing with pain < 2/10.  3.  Pt to exhibit full knee AROM to allow for kneeling, bending squatting as is necessary for ADL's, IADL's and household activities.   4.  Pt to exhibit LE endurance/strength to 5/5 to allow for walking, steps and transfers to occur safely.  5.  Pt to demonstrate increased stability of the knee to balance on foam as needed to traverse uneven terrain.       Progress per Plan of Care and Progress strengthening /stabilization /functional activity  As able to tolerate.          Timed:         Manual  Therapy:         mins  25981;     Therapeutic Exercise:   28      mins  56560;     Neuromuscular Nguyễn:        mins  28301;    Therapeutic Activity:          mins  31550;     Gait Training:      15     mins  19463;     Ultrasound:          mins  24055;    Ionto                                   mins   92065  Self Care                            mins   00533  Canalith Repos                   mins  4209  Aquatic                               mins 64481    Un-Timed:  Electrical Stimulation:         mins  10892 ( );  Dry Needling          mins self-pay  Traction          mins 82849    Timed Treatment:  43    mins   Total Treatment:    43    mins    Kat Cherry PT  [place] License # [id number]  Physical Therapist

## 2021-06-03 ENCOUNTER — TREATMENT (OUTPATIENT)
Dept: PHYSICAL THERAPY | Facility: CLINIC | Age: 71
End: 2021-06-03

## 2021-06-03 DIAGNOSIS — M17.12 OSTEOARTHRITIS OF LEFT KNEE, UNSPECIFIED OSTEOARTHRITIS TYPE: Primary | ICD-10-CM

## 2021-06-03 PROCEDURE — 97110 THERAPEUTIC EXERCISES: CPT | Performed by: PHYSICAL THERAPIST

## 2021-06-03 PROCEDURE — 97116 GAIT TRAINING THERAPY: CPT | Performed by: PHYSICAL THERAPIST

## 2021-06-03 NOTE — PROGRESS NOTES
Physical Therapy Daily Progress Note  VISIT#: 7 POC 12    Subjective     Rosanne Hutchins reports: came in with no pain today. She did have some knee swelling the other day. She elevated the leg. Advised to also put ice on it when it does it again.  Patient stressed due to daughter's divorce and son in law moving in with her.    Objective       See Exercise, Manual, and Modality Logs for complete treatment.     Patient Education: review/practiced HEP,    Warmup on bike for bending and Nustep for straightening knees.  Interval walking:  Still worked on improved posture during and looking up, interval 4 laps each.  Access Code: [email or web address]  URL: [email or web address]  Date: 06/03/2021  Prepared by: Kat Cherry    Exercises  Standing Hamstring Stretch on Chair - 1 x daily - 7 x weekly - 1 sets - 3 reps - 20 sec hold  Hip Flexor Stretch with Chair - 1 x daily - 7 x weekly - 1 sets - 3 reps - 20 sec hold        Assessment/Plan    Patient still gets SOA, needs frequent rest brakes.  Added hip and Hamstring stretch to HEP.    SHORT TERM GOALS: Time for Goal Achievement:  6 visits    1.  Patient to be compliant with  Initial HEP - met  2.  Pt able to ascend/descend steps and transfer with less knee pain < 5/10 - met  3.  Pt can tolerate 10 min warm up on Nustep and full revolutions on bike - met  4.  Pt. to exhibit increased LE endurance/strength to 4+/5 to allow for increased ease with sit-stand and standing/walking > 30 minutes - making progress    LONG TERM GOALS: Time for Goal Achievement: 12 visits  1.  Pt to have significant improvement on perceived disability score/outcome measure.  2.  Patient able to ascend/descend steps and prolonged standing with pain < 2/10.  3.  Pt to exhibit full knee AROM to allow for kneeling, bending squatting as is necessary for ADL's, IADL's and household activities.   4.  Pt to exhibit LE endurance/strength to 5/5 to allow for walking, steps and transfers to occur safely.  5.  Pt to  demonstrate increased stability of the knee to balance on foam as needed to traverse uneven terrain.       Progress per Plan of Care and Progress strengthening /stabilization /functional activity  As able to tolerate.          Timed:         Manual Therapy:         mins  03809;     Therapeutic Exercise:   35      mins  22586;     Neuromuscular Nguyễn:        mins  87638;    Therapeutic Activity:          mins  32561;     Gait Training:      10     mins  65354;     Ultrasound:          mins  80967;    Ionto                                   mins   88184  Self Care                            mins   44835  Canalith Repos                   mins  4209  Aquatic                               mins 33008    Un-Timed:  Electrical Stimulation:         mins  33966 ( );  Dry Needling          mins self-pay  Traction          mins 02828    Timed Treatment:  45    mins   Total Treatment:    45    mins    Kat Cherry PT  [place] License # [id number]  Physical Therapist

## 2021-06-08 ENCOUNTER — TREATMENT (OUTPATIENT)
Dept: PHYSICAL THERAPY | Facility: CLINIC | Age: 71
End: 2021-06-08

## 2021-06-08 DIAGNOSIS — M17.12 OSTEOARTHRITIS OF LEFT KNEE, UNSPECIFIED OSTEOARTHRITIS TYPE: Primary | ICD-10-CM

## 2021-06-08 PROCEDURE — 97110 THERAPEUTIC EXERCISES: CPT | Performed by: PHYSICAL THERAPIST

## 2021-06-08 PROCEDURE — 97112 NEUROMUSCULAR REEDUCATION: CPT | Performed by: PHYSICAL THERAPIST

## 2021-06-08 NOTE — PROGRESS NOTES
Physical Therapy Daily Progress Note  VISIT#: 8 POC 12    Subjective     Rosanne Hutchins reports: came in with no pain today. She has not been very active yet today.   Objective       See Exercise, Manual, and Modality Logs for complete treatment.     Patient Education: review/practiced HEP,    Warmup on bike for bending and Nustep for straightening knees.  Interval walking:  Still worked on improved posture during and looking up, interval 4 laps each.  Was able to progress NK table to 5#: 3 x 10 reps.  Left anterior knee still sore during Hamstring stretch.   Progressed to balance on Aerex mat. Advanced to 20 rep steps ups.        Assessment/Plan    Patient still gets SOA, needs frequent rest brakes.      SHORT TERM GOALS: Time for Goal Achievement:  6 visits    1.  Patient to be compliant with  Initial HEP - met  2.  Pt able to ascend/descend steps and transfer with less knee pain < 5/10 - met  3.  Pt can tolerate 10 min warm up on Nustep and full revolutions on bike - met  4.  Pt. to exhibit increased LE endurance/strength to 4+/5 to allow for increased ease with sit-stand and standing/walking > 30 minutes - making progress    LONG TERM GOALS: Time for Goal Achievement: 12 visits  1.  Pt to have significant improvement on perceived disability score/outcome measure.  2.  Patient able to ascend/descend steps and prolonged standing with pain < 2/10.  3.  Pt to exhibit full knee AROM to allow for kneeling, bending squatting as is necessary for ADL's, IADL's and household activities.   4.  Pt to exhibit LE endurance/strength to 5/5 to allow for walking, steps and transfers to occur safely.  5.  Pt to demonstrate increased stability of the knee to balance on foam as needed to traverse uneven terrain - making progress.      Progress per Plan of Care and Progress strengthening /stabilization /functional activity  As able to tolerate.          Timed:         Manual Therapy:         mins  49839;     Therapeutic Exercise:   30       mins  53633;     Neuromuscular Nguyễn:   15     mins  89599;    Therapeutic Activity:          mins  85811;     Gait Training:           mins  82132;     Ultrasound:          mins  45935;    Ionto                                   mins   27963  Self Care                            mins   96183  Canalith Repos                   mins  4209  Aquatic                               mins 08499    Un-Timed:  Electrical Stimulation:         mins  18409 ( );  Dry Needling          mins self-pay  Traction          mins 26112    Timed Treatment:  45    mins   Total Treatment:    45    mins    Kat Cherry PT  [place] License # [id number]  Physical Therapist

## 2021-06-16 ENCOUNTER — HOSPITAL ENCOUNTER (OUTPATIENT)
Dept: CARDIOLOGY | Facility: HOSPITAL | Age: 71
Discharge: HOME | End: 2021-06-16

## 2021-06-16 VITALS
HEART RATE: 67 BPM | DIASTOLIC BLOOD PRESSURE: 80 MMHG | SYSTOLIC BLOOD PRESSURE: 138 MMHG | WEIGHT: 293 LBS | BODY MASS INDEX: 43.4 KG/M2 | HEIGHT: 69 IN

## 2021-06-16 DIAGNOSIS — I48.3 TYPICAL ATRIAL FLUTTER (HCC): ICD-10-CM

## 2021-06-16 DIAGNOSIS — Z01.810 PREOPERATIVE CARDIOVASCULAR EXAMINATION: ICD-10-CM

## 2021-06-16 DIAGNOSIS — I20.8 STABLE ANGINA PECTORIS (HCC): Primary | ICD-10-CM

## 2021-06-16 DIAGNOSIS — I48.11 LONGSTANDING PERSISTENT ATRIAL FIBRILLATION (HCC): ICD-10-CM

## 2021-06-16 DIAGNOSIS — I10 ESSENTIAL HYPERTENSION: ICD-10-CM

## 2021-06-16 DIAGNOSIS — R94.30 ABNORMAL RESULTS OF CARDIOVASCULAR FUNCTION STUDIES: ICD-10-CM

## 2021-06-16 DIAGNOSIS — Z51.81 ANTICOAGULATION MANAGEMENT ENCOUNTER: ICD-10-CM

## 2021-06-16 DIAGNOSIS — Z79.01 ANTICOAGULATION MANAGEMENT ENCOUNTER: ICD-10-CM

## 2021-06-16 LAB
BH CV REST NUCLEAR ISOTOPE DOSE: 8.8 MCI
BH CV STRESS BP STAGE 1: NORMAL
BH CV STRESS COMMENTS STAGE 1: NORMAL
BH CV STRESS DOSE REGADENOSON STAGE 1: 0.4
BH CV STRESS DURATION MIN STAGE 1: 0
BH CV STRESS DURATION SEC STAGE 1: 10
BH CV STRESS HR STAGE 1: 84
BH CV STRESS NUCLEAR ISOTOPE DOSE: 33.2 MCI
BH CV STRESS PROTOCOL 1: NORMAL
BH CV STRESS RECOVERY BP: NORMAL MMHG
BH CV STRESS RECOVERY HR: 74 BPM
BH CV STRESS STAGE 1: 1
LV EF NUC BP: 51 %
MAXIMAL PREDICTED HEART RATE: 151 BPM
PERCENT MAX PREDICTED HR: 55.63 %
STRESS BASELINE BP: NORMAL MMHG
STRESS BASELINE HR: 62 BPM
STRESS PERCENT HR: 65 %
STRESS POST PEAK BP: NORMAL MMHG
STRESS POST PEAK HR: 84 BPM
STRESS TARGET HR: 128 BPM

## 2021-06-16 PROCEDURE — A9500 TC99M SESTAMIBI: HCPCS | Performed by: INTERNAL MEDICINE

## 2021-06-16 PROCEDURE — 78452 HT MUSCLE IMAGE SPECT MULT: CPT | Performed by: INTERNAL MEDICINE

## 2021-06-16 PROCEDURE — 93017 CV STRESS TEST TRACING ONLY: CPT

## 2021-06-16 PROCEDURE — 78452 HT MUSCLE IMAGE SPECT MULT: CPT

## 2021-06-16 PROCEDURE — 93306 TTE W/DOPPLER COMPLETE: CPT | Performed by: INTERNAL MEDICINE

## 2021-06-16 PROCEDURE — 0 TECHNETIUM SESTAMIBI: Performed by: INTERNAL MEDICINE

## 2021-06-16 PROCEDURE — 93016 CV STRESS TEST SUPVJ ONLY: CPT | Performed by: INTERNAL MEDICINE

## 2021-06-16 PROCEDURE — 93306 TTE W/DOPPLER COMPLETE: CPT

## 2021-06-16 PROCEDURE — 93018 CV STRESS TEST I&R ONLY: CPT | Performed by: INTERNAL MEDICINE

## 2021-06-16 PROCEDURE — 25010000002 REGADENOSON 0.4 MG/5ML SOLUTION: Performed by: INTERNAL MEDICINE

## 2021-06-16 RX ADMIN — TECHNETIUM TC 99M SESTAMIBI 1 DOSE: 1 INJECTION INTRAVENOUS at 11:35

## 2021-06-16 RX ADMIN — REGADENOSON 0.4 MG: 0.08 INJECTION, SOLUTION INTRAVENOUS at 11:35

## 2021-06-16 RX ADMIN — TECHNETIUM TC 99M SESTAMIBI 1 DOSE: 1 INJECTION INTRAVENOUS at 10:00

## 2021-06-17 ENCOUNTER — DOCUMENTATION (OUTPATIENT)
Dept: PHYSICAL THERAPY | Facility: CLINIC | Age: 71
End: 2021-06-17

## 2021-06-17 PROBLEM — R94.30 ABNORMAL RESULTS OF CARDIOVASCULAR FUNCTION STUDIES: Status: ACTIVE | Noted: 2021-06-17

## 2021-06-17 PROBLEM — I20.8 STABLE ANGINA PECTORIS (HCC): Status: ACTIVE | Noted: 2021-06-17

## 2021-06-17 PROBLEM — I20.89 STABLE ANGINA PECTORIS: Status: ACTIVE | Noted: 2021-06-17

## 2021-06-17 LAB
ASCENDING AORTA: 3.9 CM
BH CV ECHO MEAS - ACS: 2 CM
BH CV ECHO MEAS - AI DEC SLOPE: 96.4 CM/SEC^2
BH CV ECHO MEAS - AI DEC TIME: 3.4 SEC
BH CV ECHO MEAS - AI MAX PG: 45.4 MMHG
BH CV ECHO MEAS - AI MAX VEL: 337.1 CM/SEC
BH CV ECHO MEAS - AI P1/2T: 1024 MSEC
BH CV ECHO MEAS - AO MAX PG (FULL): 1.5 MMHG
BH CV ECHO MEAS - AO MAX PG: 4.6 MMHG
BH CV ECHO MEAS - AO MEAN PG (FULL): 1.1 MMHG
BH CV ECHO MEAS - AO MEAN PG: 2.8 MMHG
BH CV ECHO MEAS - AO ROOT AREA (BSA CORRECTED): 1.4
BH CV ECHO MEAS - AO ROOT AREA: 9.6 CM^2
BH CV ECHO MEAS - AO ROOT DIAM: 3.5 CM
BH CV ECHO MEAS - AO V2 MAX: 107.4 CM/SEC
BH CV ECHO MEAS - AO V2 MEAN: 80 CM/SEC
BH CV ECHO MEAS - AO V2 VTI: 23.4 CM
BH CV ECHO MEAS - ASC AORTA: 3.9 CM
BH CV ECHO MEAS - AVA(I,A): 3 CM^2
BH CV ECHO MEAS - AVA(I,D): 3 CM^2
BH CV ECHO MEAS - AVA(V,A): 3 CM^2
BH CV ECHO MEAS - AVA(V,D): 3 CM^2
BH CV ECHO MEAS - BSA(HAYCOCK): 2.6 M^2
BH CV ECHO MEAS - BSA: 2.4 M^2
BH CV ECHO MEAS - BZI_BMI: 44 KILOGRAMS/M^2
BH CV ECHO MEAS - BZI_METRIC_HEIGHT: 175.3 CM
BH CV ECHO MEAS - BZI_METRIC_WEIGHT: 135.2 KG
BH CV ECHO MEAS - EDV(CUBED): 121.5 ML
BH CV ECHO MEAS - EDV(MOD-SP2): 130.5 ML
BH CV ECHO MEAS - EDV(MOD-SP4): 105.3 ML
BH CV ECHO MEAS - EDV(TEICH): 115.7 ML
BH CV ECHO MEAS - EF(CUBED): 68.7 %
BH CV ECHO MEAS - EF(MOD-BP): 52 %
BH CV ECHO MEAS - EF(MOD-SP2): 52.4 %
BH CV ECHO MEAS - EF(MOD-SP4): 50.7 %
BH CV ECHO MEAS - EF(TEICH): 60.1 %
BH CV ECHO MEAS - ESV(CUBED): 38 ML
BH CV ECHO MEAS - ESV(MOD-SP2): 62.1 ML
BH CV ECHO MEAS - ESV(MOD-SP4): 51.9 ML
BH CV ECHO MEAS - ESV(TEICH): 46.2 ML
BH CV ECHO MEAS - FS: 32.1 %
BH CV ECHO MEAS - IVS/LVPW: 1.1
BH CV ECHO MEAS - IVSD: 1.1 CM
BH CV ECHO MEAS - LA DIMENSION(2D): 4.2 CM
BH CV ECHO MEAS - LA DIMENSION: 4.7 CM
BH CV ECHO MEAS - LA/AO: 1.3
BH CV ECHO MEAS - LAT PEAK E' VEL: 9 CM/SEC
BH CV ECHO MEAS - LV DIASTOLIC VOL/BSA (35-75): 43 ML/M^2
BH CV ECHO MEAS - LV IVRT: 0.06 SEC
BH CV ECHO MEAS - LV MASS(C)D: 184.5 GRAMS
BH CV ECHO MEAS - LV MASS(C)DI: 75.4 GRAMS/M^2
BH CV ECHO MEAS - LV MAX PG: 3.2 MMHG
BH CV ECHO MEAS - LV MEAN PG: 1.7 MMHG
BH CV ECHO MEAS - LV SYSTOLIC VOL/BSA (12-30): 21.2 ML/M^2
BH CV ECHO MEAS - LV V1 MAX: 88.9 CM/SEC
BH CV ECHO MEAS - LV V1 MEAN: 61.9 CM/SEC
BH CV ECHO MEAS - LV V1 VTI: 19.6 CM
BH CV ECHO MEAS - LVIDD: 5 CM
BH CV ECHO MEAS - LVIDS: 3.4 CM
BH CV ECHO MEAS - LVOT AREA: 3.6 CM^2
BH CV ECHO MEAS - LVOT DIAM: 2.1 CM
BH CV ECHO MEAS - LVPWD: 0.96 CM
BH CV ECHO MEAS - MED PEAK E' VEL: 9 CM/SEC
BH CV ECHO MEAS - MV E MAX VEL: 82.9 CM/SEC
BH CV ECHO MEAS - MV MAX PG: 3.4 MMHG
BH CV ECHO MEAS - MV MEAN PG: 1 MMHG
BH CV ECHO MEAS - MV P1/2T: 58 MSEC
BH CV ECHO MEAS - MV V2 MAX: 92.5 CM/SEC
BH CV ECHO MEAS - MV V2 MEAN: 44.4 CM/SEC
BH CV ECHO MEAS - MV V2 VTI: 20.3 CM
BH CV ECHO MEAS - MVA(P1/2T): 3.8 CM2
BH CV ECHO MEAS - MVA(VTI): 3.5 CM^2
BH CV ECHO MEAS - PA ACC SLOPE: 708 CM/SEC2
BH CV ECHO MEAS - PA ACC TIME: 0.1 SEC
BH CV ECHO MEAS - PA MAX PG (FULL): 0.63 MMHG
BH CV ECHO MEAS - PA MAX PG: 2.8 MMHG
BH CV ECHO MEAS - PA MEAN PG (FULL): 0.54 MMHG
BH CV ECHO MEAS - PA MEAN PG: 1.5 MMHG
BH CV ECHO MEAS - PA PR(ACCEL): 33.9 MMHG
BH CV ECHO MEAS - PA V2 MAX: 82.9 CM/SEC
BH CV ECHO MEAS - PA V2 MEAN: 59.2 CM/SEC
BH CV ECHO MEAS - PA V2 VTI: 16.9 CM
BH CV ECHO MEAS - PAPD(PI EDV): 13.5 MMHG
BH CV ECHO MEAS - PI END-D VEL: 116.8 CM/SEC
BH CV ECHO MEAS - RAP SYSTOLE: 8 MMHG
BH CV ECHO MEAS - RV MAX PG: 2.1 MMHG
BH CV ECHO MEAS - RV MEAN PG: 0.99 MMHG
BH CV ECHO MEAS - RV V1 MAX: 72.6 CM/SEC
BH CV ECHO MEAS - RV V1 MEAN: 46.1 CM/SEC
BH CV ECHO MEAS - RV V1 VTI: 14.2 CM
BH CV ECHO MEAS - RVSP: 31 MMHG
BH CV ECHO MEAS - SI(AO): 91.7 ML/M^2
BH CV ECHO MEAS - SI(CUBED): 34.1 ML/M^2
BH CV ECHO MEAS - SI(LVOT): 28.8 ML/M^2
BH CV ECHO MEAS - SI(MOD-SP2): 27.9 ML/M^2
BH CV ECHO MEAS - SI(MOD-SP4): 21.8 ML/M^2
BH CV ECHO MEAS - SI(TEICH): 28.4 ML/M^2
BH CV ECHO MEAS - SV(AO): 224.3 ML
BH CV ECHO MEAS - SV(CUBED): 83.5 ML
BH CV ECHO MEAS - SV(LVOT): 70.4 ML
BH CV ECHO MEAS - SV(MOD-SP2): 68.3 ML
BH CV ECHO MEAS - SV(MOD-SP4): 53.4 ML
BH CV ECHO MEAS - SV(TEICH): 69.5 ML
BH CV ECHO MEAS - TAPSE (>1.6): 2 CM
BH CV ECHO MEAS - TR MAX PG: 27 MMHG
BH CV ECHO MEAS - TR MAX VEL: 239.3 CM/SEC
BH CV ECHO MEASUREMENTS AVERAGE E/E' RATIO: 9.21
BH CV VAS BP LEFT ARM: NORMAL MMHG
BH CV XLRA - RV BASE: 4.2 CM
BH CV XLRA - RV MID: 3.1 CM
BH CV XLRA - TDI S': 11 CM/SEC
LEFT ATRIUM VOLUME INDEX: 32 ML/M2
LEFT ATRIUM VOLUME: 78 CM3
LV EF 2D ECHO EST: 55 %

## 2021-06-17 NOTE — PROGRESS NOTES
Discharge Summary  Discharge Summary from Physical Therapy Report       reg. Rosanne Hutchins : 1950  Number of Visits:      Goals: Partially Met    Discharge Plan: Future need for rehabilitation activities  Patient to return to referring/providing physician    Comments: patient called to cancel all her remaining PT appointments. She had a stress test down. She has a blockage and will have a Heart cath done on next Tuesday. Patient may have Cardiac Rehab or will need a new PT order to resume PT once cleared by heart MD.    Date of Discharge: 21        Kat Cherry, PT  Physical Therapist

## 2021-06-21 RX ORDER — METFORMIN HYDROCHLORIDE 500 MG/1
500 TABLET, EXTENDED RELEASE ORAL
COMMUNITY

## 2021-06-22 ENCOUNTER — LAB (OUTPATIENT)
Dept: LAB | Facility: HOSPITAL | Age: 71
End: 2021-06-22

## 2021-06-22 ENCOUNTER — HOSPITAL ENCOUNTER (OUTPATIENT)
Facility: HOSPITAL | Age: 71
Setting detail: HOSPITAL OUTPATIENT SURGERY
Discharge: HOME | End: 2021-06-22
Attending: INTERNAL MEDICINE | Admitting: INTERNAL MEDICINE

## 2021-06-22 ENCOUNTER — HOSPITAL ENCOUNTER (OUTPATIENT)
Dept: GENERAL RADIOLOGY | Facility: HOSPITAL | Age: 71
Discharge: HOME | End: 2021-06-22

## 2021-06-22 VITALS
BODY MASS INDEX: 43.4 KG/M2 | RESPIRATION RATE: 15 BRPM | OXYGEN SATURATION: 99 % | SYSTOLIC BLOOD PRESSURE: 117 MMHG | HEIGHT: 69 IN | TEMPERATURE: 97.6 F | HEART RATE: 79 BPM | DIASTOLIC BLOOD PRESSURE: 81 MMHG | WEIGHT: 293 LBS

## 2021-06-22 DIAGNOSIS — I20.8 STABLE ANGINA PECTORIS (HCC): ICD-10-CM

## 2021-06-22 DIAGNOSIS — R94.30 ABNORMAL RESULTS OF CARDIOVASCULAR FUNCTION STUDIES: ICD-10-CM

## 2021-06-22 DIAGNOSIS — I48.11 LONGSTANDING PERSISTENT ATRIAL FIBRILLATION (HCC): ICD-10-CM

## 2021-06-22 LAB
ANION GAP SERPL CALCULATED.3IONS-SCNC: 11 MMOL/L (ref 5–15)
BASOPHILS # BLD AUTO: 0.1 10*3/MM3 (ref 0–0.2)
BASOPHILS NFR BLD AUTO: 1.1 % (ref 0–1.5)
BUN SERPL-MCNC: 15 MG/DL (ref 8–23)
BUN/CREAT SERPL: 13.8 (ref 7–25)
CALCIUM SPEC-SCNC: 8.8 MG/DL (ref 8.6–10.5)
CHLORIDE SERPL-SCNC: 107 MMOL/L (ref 98–107)
CO2 SERPL-SCNC: 24 MMOL/L (ref 22–29)
CREAT SERPL-MCNC: 1.09 MG/DL (ref 0.57–1)
DEPRECATED RDW RBC AUTO: 45.9 FL (ref 37–54)
EOSINOPHIL # BLD AUTO: 0.4 10*3/MM3 (ref 0–0.4)
EOSINOPHIL NFR BLD AUTO: 5.5 % (ref 0.3–6.2)
ERYTHROCYTE [DISTWIDTH] IN BLOOD BY AUTOMATED COUNT: 15.1 % (ref 12.3–15.4)
GFR SERPL CREATININE-BSD FRML MDRD: 50 ML/MIN/1.73
GLUCOSE SERPL-MCNC: 119 MG/DL (ref 65–99)
HCT VFR BLD AUTO: 40.4 % (ref 34–46.6)
HGB BLD-MCNC: 13.3 G/DL (ref 12–15.9)
INR PPP: 1.04 (ref 0.93–1.1)
LYMPHOCYTES # BLD AUTO: 1.7 10*3/MM3 (ref 0.7–3.1)
LYMPHOCYTES NFR BLD AUTO: 25 % (ref 19.6–45.3)
MCH RBC QN AUTO: 28.3 PG (ref 26.6–33)
MCHC RBC AUTO-ENTMCNC: 33 G/DL (ref 31.5–35.7)
MCV RBC AUTO: 85.8 FL (ref 79–97)
MONOCYTES # BLD AUTO: 0.5 10*3/MM3 (ref 0.1–0.9)
MONOCYTES NFR BLD AUTO: 7.2 % (ref 5–12)
NEUTROPHILS NFR BLD AUTO: 4.1 10*3/MM3 (ref 1.7–7)
NEUTROPHILS NFR BLD AUTO: 61.2 % (ref 42.7–76)
NRBC BLD AUTO-RTO: 0 /100 WBC (ref 0–0.2)
PLATELET # BLD AUTO: 267 10*3/MM3 (ref 140–450)
PMV BLD AUTO: 7.4 FL (ref 6–12)
POTASSIUM SERPL-SCNC: 3.8 MMOL/L (ref 3.5–5.2)
PROTHROMBIN TIME: 11.5 SECONDS (ref 9.6–11.7)
RBC # BLD AUTO: 4.71 10*6/MM3 (ref 3.77–5.28)
SODIUM SERPL-SCNC: 142 MMOL/L (ref 136–145)
WBC # BLD AUTO: 6.7 10*3/MM3 (ref 3.4–10.8)

## 2021-06-22 PROCEDURE — 25010000002 MIDAZOLAM PER 1 MG: Performed by: INTERNAL MEDICINE

## 2021-06-22 PROCEDURE — 80048 BASIC METABOLIC PNL TOTAL CA: CPT

## 2021-06-22 PROCEDURE — C1894 INTRO/SHEATH, NON-LASER: HCPCS | Performed by: INTERNAL MEDICINE

## 2021-06-22 PROCEDURE — 99152 MOD SED SAME PHYS/QHP 5/>YRS: CPT | Performed by: INTERNAL MEDICINE

## 2021-06-22 PROCEDURE — 71046 X-RAY EXAM CHEST 2 VIEWS: CPT

## 2021-06-22 PROCEDURE — 93454 CORONARY ARTERY ANGIO S&I: CPT | Performed by: INTERNAL MEDICINE

## 2021-06-22 PROCEDURE — 0 IOPAMIDOL PER 1 ML: Performed by: INTERNAL MEDICINE

## 2021-06-22 PROCEDURE — C1769 GUIDE WIRE: HCPCS | Performed by: INTERNAL MEDICINE

## 2021-06-22 PROCEDURE — 25010000002 FENTANYL CITRATE (PF) 100 MCG/2ML SOLUTION: Performed by: INTERNAL MEDICINE

## 2021-06-22 PROCEDURE — 85025 COMPLETE CBC W/AUTO DIFF WBC: CPT

## 2021-06-22 PROCEDURE — 36415 COLL VENOUS BLD VENIPUNCTURE: CPT

## 2021-06-22 PROCEDURE — 85610 PROTHROMBIN TIME: CPT

## 2021-06-22 PROCEDURE — C1760 CLOSURE DEV, VASC: HCPCS | Performed by: INTERNAL MEDICINE

## 2021-06-22 RX ORDER — ONDANSETRON 4 MG/1
4 TABLET, FILM COATED ORAL EVERY 6 HOURS PRN
Status: CANCELLED | OUTPATIENT
Start: 2021-06-22

## 2021-06-22 RX ORDER — ONDANSETRON 2 MG/ML
4 INJECTION INTRAMUSCULAR; INTRAVENOUS EVERY 6 HOURS PRN
Status: CANCELLED | OUTPATIENT
Start: 2021-06-22

## 2021-06-22 RX ORDER — FENTANYL CITRATE 50 UG/ML
INJECTION, SOLUTION INTRAMUSCULAR; INTRAVENOUS AS NEEDED
Status: DISCONTINUED | OUTPATIENT
Start: 2021-06-22 | End: 2021-06-22 | Stop reason: HOSPADM

## 2021-06-22 RX ORDER — SODIUM CHLORIDE 9 MG/ML
30 INJECTION, SOLUTION INTRAVENOUS CONTINUOUS
Status: DISCONTINUED | OUTPATIENT
Start: 2021-06-22 | End: 2021-06-22 | Stop reason: HOSPADM

## 2021-06-22 RX ORDER — MIDAZOLAM HYDROCHLORIDE 1 MG/ML
INJECTION INTRAMUSCULAR; INTRAVENOUS AS NEEDED
Status: DISCONTINUED | OUTPATIENT
Start: 2021-06-22 | End: 2021-06-22 | Stop reason: HOSPADM

## 2021-06-22 RX ORDER — ACETAMINOPHEN 325 MG/1
650 TABLET ORAL EVERY 4 HOURS PRN
Status: CANCELLED | OUTPATIENT
Start: 2021-06-22

## 2021-06-22 RX ORDER — LIDOCAINE HYDROCHLORIDE 20 MG/ML
INJECTION, SOLUTION INFILTRATION; PERINEURAL AS NEEDED
Status: DISCONTINUED | OUTPATIENT
Start: 2021-06-22 | End: 2021-06-22 | Stop reason: HOSPADM

## 2021-06-22 RX ADMIN — SODIUM CHLORIDE 30 ML/HR: 9 INJECTION, SOLUTION INTRAVENOUS at 10:27

## 2021-06-22 NOTE — DISCHARGE INSTR - ACTIVITY
Resume Xarelto tomorrow 6/23/21 evening.      Post Cath Instructions    Call Dr. Diaz’s office to schedule a follow up appointment in 2 - 3 weeks.  Specific Physician Instructions: ***    1) Drink plenty of fluids for the next 24 hours.  This helps to eliminate the dye used in your procedure through urination.  You may resume a normal diet; however, try to avoid foods that would cause gas or constipation.    2) Sedative medication given to you during your catheterization may decrease your judgement and reaction time for up to 24-48 hours.  Therefore:  a. DO NOT drive or operate hazardous machinery (48 hours)  b. DO NOT consume alcoholic beverages  c. DO NOT make any important/legal decisions  d. Have someone stay with you for at least 24 hours    3) To allow proper healing and prevent bleeding, the following activities are to be strictly avoided for the next 24-48 hours:  a. Excessive bending at wound site  b. Straining (anything that would tense up muscles around the affected puncture site)  c. Lifting objects greater than 5 pounds, pushing, or pulling for 5 days  i. For Groin Cases:  1. Refrain from sexual activity  2. Refrain from running or vigorous walking  3. No prolonged sitting or standing  4. Limit stair climbing as much as possible    4) Keep the puncture site clean and dry.  You may remove the dressing tomorrow and replace it with a band-aid for at least one additional day.  Gently clean the site with mild soap and water.  No scrubbing/rubbing and lightly pat the area dry.  Showers are acceptable; however, avoid submerging in water (tub baths, hot tubs, swimming pools, etc…) for at least one week.  The site should be completely healed before resuming these activities to reduce the risk of infection.  Check the site often.  Watch for signs and symptoms of infection and notify your physician if any of the following occur:  a. Bleeding or an increase in swelling at the puncture site  b. Fever  c. Increased  soreness around puncture site  d. Foul odor or significant drainage from the puncture site  e. Swelling, redness, or warmth at the puncture site    **A bruise or small “pea sized” lump under the skin at the puncture site is not unusual.  This should disappear within 3-4 weeks.**  5) CONTACT YOUR PHYSICIAN OR CALL 911 IF YOU EXPERIENCE ANY OF THE FOLLOWING:  a. Increased angina (chest pain) or frequent sensations of pressure, burning, pain, or other discomfort in the chest, arm, jaws, or stomach  b. Lightheadedness, dizziness, faint feeling, sweating, or difficulty breathing  c. Odd sensation changes like numbness, tingling, coldness, or pain in the arm or leg in which the catheter was inserted  d. Limb in which the catheter was inserted becomes pale/bluish in color    IMPORTANT:  Although this occurs very rarely, if you should develop bright red or excessive bleeding, feel a “pop” inside at the insertion site, or notice a sudden increase in swelling larger than a walnut, you should call 911.  Hold continuous firm pressure to the access site until emergency personnel arrive.  It is best if someone else can do this for you.

## 2021-06-22 NOTE — H&P
[facility] CARDIOLOGY    Tabby Cast MD    CHIEF COMPLAINT:     Chronic atrial fibrillation  Hypertension    HISTORY OF PRESENT ILLNESS:    70 y.o. female past medical history that is significant for history of hypertension obesity and also history of obstructive sleep apnea currently not on CPAP and also prior history of atrial fibrillation currently on anticoagulation therapy here to establish cardiology care     Interval History:  Patient has been compliant with medical therapy with anticoagulation therapy and also digoxin for rate control  Patient was treated with sotalol and also Rythmol in the past with not being able to maintain the sinus rhythm  She was attempted cardioversion in the past  Currently on no rate control and anticoagulation therapy with no significant symptoms from the A. fib standpoint  Patient does have some shortness of breath and dyspnea on exertion with no recent ischemic evaluation or cardiac work-up  Recent labs show normal chemistries and digoxin level at the higher end of normal subspectrum with a digoxin level of 2  Lipids are reasonable  Thyroid function was normal     Assessment & Plan     Impressions:  Chronic atrial fibrillation  Hypertension  Obesity/Body mass index is 44.01 kg/m².   Obstructive sleep apnea currently not using the CPAP  Hyperlipidemia  Recent diagnosis of diabetes mellitus with hemoglobin A1c of 7.6     Recommendations:  Agree with aggressive risk factor modification regular exercise and weight loss  Discontinue digoxin and start patient on Toprol-XL 25 mg p.o. once a day for rate control  Continue anticoagulation therapy with Xarelto  Risk benefits and alternatives for long-term oral anticoagulation reviewed and discussed with the patient  Likely will benefit from ischemic evaluation at this time  Prior work-up and labs reviewed and discussed with patient      Past Medical History:   Diagnosis Date   • Atrial fibrillation (CMS/[facility])    •  Hypertension    • Sleep apnea      Past Surgical History:   Procedure Laterality Date   • ANKLE SURGERY Right    • CARDIOVERSION  04/15/2016    Unsuccessful    • SHOULDER SURGERY Left    • TONSILLECTOMY  1956   • TOTAL ABDOMINAL HYSTERECTOMY  10/18/2016     Family History   Problem Relation Age of Onset   • Diabetes Mother    • Hypertension Mother    • Heart disease Mother    • Diabetes Father    • Stroke Paternal Grandmother      Social History     Tobacco Use   • Smoking status: Never Smoker   • Smokeless tobacco: Never Used   Substance Use Topics   • Alcohol use: Yes   • Drug use: Never     Medications Prior to Admission   Medication Sig Dispense Refill Last Dose   • metFORMIN ER (GLUCOPHAGE-XR) 500 MG 24 hr tablet Take 500 mg by mouth Daily With Breakfast.      • rivaroxaban (XARELTO) 20 MG tablet Take 20 mg by mouth Daily.   6/20/2021 at Unknown time   • budesonide-formoterol (SYMBICORT) 160-4.5 MCG/ACT inhaler Inhale 2 puffs 2 (Two) Times a Day As Needed.      • buPROPion XL (WELLBUTRIN XL) 300 MG 24 hr tablet Take 300 mg by mouth Every Morning.      • metoprolol succinate XL (TOPROL-XL) 25 MG 24 hr tablet Take 1 tablet by mouth Daily. 90 tablet 3    • venlafaxine XR (EFFEXOR-XR) 75 MG 24 hr capsule Take 225 mg by mouth Daily. One 150 mg capsule and One 75 mg capsule once daily        Allergies:  Patient has no known allergies.      There is no immunization history on file for this patient.        REVIEW OF SYSTEMS:   Review of Systems   Constitutional: Negative for chills, decreased appetite and malaise/fatigue.   HENT: Negative for congestion.    Eyes: Negative for blurred vision and double vision.   Cardiovascular: Negative for chest pain, dyspnea on exertion, leg swelling, near-syncope and orthopnea.   Respiratory: Negative for cough and shortness of breath.    Hematologic/Lymphatic: Negative for adenopathy. Does not bruise/bleed easily.   Skin: Negative for rash.   Gastrointestinal: Negative for bloating  and abdominal pain.   Neurological: Negative for dizziness and focal weakness.       Medication Review:  Scheduled Meds:  Continuous Infusions:No current facility-administered medications for this encounter.    PRN Meds:.    Vital Signs  There were no vitals taken for this visit.         Physical Exam:  Constitutional:       Appearance: Well-developed.   Eyes:      Conjunctiva/sclera: Conjunctivae normal.      Pupils: Pupils are equal, round, and reactive to light.   HENT:      Head: Normocephalic and atraumatic.   Neck:      Thyroid: No thyromegaly.   Pulmonary:      Effort: Pulmonary effort is normal.      Breath sounds: Normal breath sounds.   Cardiovascular:      Normal rate. Regular rhythm.   Pulses:     Intact distal pulses.   Edema:     Peripheral edema absent.   Abdominal:      General: Bowel sounds are normal.      Palpations: Abdomen is soft.   Musculoskeletal:      Cervical back: Normal range of motion and neck supple. Skin:     General: Skin is warm.   Neurological:      Mental Status: Alert and oriented to person, place, and time.         Results Review:    I reviewed the patient's new clinical results.  Lab Results (most recent)     None          Imaging Results (Most Recent)     None        reviewed    ECG/EMG Results (most recent)     None          Assessment/Plan     Patient Active Problem List   Diagnosis   • Atrial flutter (CMS/HCC)   • Excessive anticoagulation   • Hypertension   • Longstanding persistent atrial fibrillation (CMS/HCC)   • Stable angina pectoris (CMS/HCC)   • Abnormal results of cardiovascular function studies           Marnie Ramsay MD  06/22/21  08:18 EDT

## 2021-06-23 ENCOUNTER — TELEPHONE (OUTPATIENT)
Dept: CARDIOLOGY | Facility: CLINIC | Age: 71
End: 2021-06-23

## 2021-08-24 ENCOUNTER — OFFICE VISIT (OUTPATIENT)
Dept: CARDIOLOGY | Facility: CLINIC | Age: 71
End: 2021-08-24

## 2021-08-24 VITALS
OXYGEN SATURATION: 98 % | DIASTOLIC BLOOD PRESSURE: 95 MMHG | WEIGHT: 293 LBS | SYSTOLIC BLOOD PRESSURE: 152 MMHG | BODY MASS INDEX: 45.4 KG/M2 | HEART RATE: 102 BPM

## 2021-08-24 DIAGNOSIS — I35.0 NONRHEUMATIC AORTIC VALVE STENOSIS: ICD-10-CM

## 2021-08-24 DIAGNOSIS — E78.2 MIXED HYPERLIPIDEMIA: ICD-10-CM

## 2021-08-24 DIAGNOSIS — I48.11 LONGSTANDING PERSISTENT ATRIAL FIBRILLATION (HCC): Primary | ICD-10-CM

## 2021-08-24 DIAGNOSIS — I10 ESSENTIAL HYPERTENSION: ICD-10-CM

## 2021-08-24 PROCEDURE — 93000 ELECTROCARDIOGRAM COMPLETE: CPT | Performed by: INTERNAL MEDICINE

## 2021-08-24 PROCEDURE — 99214 OFFICE O/P EST MOD 30 MIN: CPT | Performed by: INTERNAL MEDICINE

## 2021-08-24 NOTE — PROGRESS NOTES
Cardiology Office Visit      Encounter Date:  08/24/2021    Patient ID:   Rosanne Hutchins is a 70 y.o. female.    Reason For Followup:  Chronic atrial fibrillation  Hypertension    Brief Clinical History:  Dear Tabby Reilly MD    I had the pleasure of seeing Rosanne Hutchins today. As you are well aware, this is a 70 y.o. female past medical history that is significant for history of hypertension obesity and also history of obstructive sleep apnea currently not on CPAP and also prior history of atrial fibrillation currently on anticoagulation therapy here to establish cardiology care    Interval History:  Patient has been compliant with medical therapy with anticoagulation therapy and also digoxin for rate control  Patient was treated with sotalol and also Rythmol in the past with not being able to maintain the sinus rhythm  She was attempted cardioversion in the past  Currently on no rate control and anticoagulation therapy with no significant symptoms from the A. fib standpoint  Patient does have some shortness of breath and dyspnea on exertion with no recent ischemic evaluation or cardiac work-up  Recent labs show normal chemistries and digoxin level at the higher end of normal subspectrum with a digoxin level of 2  Lipids are reasonable  Thyroid function was normal        Assessment & Plan    Impressions:  Chronic atrial fibrillation  Hypertension  Obesity/Body mass index is 44.01 kg/m².   Obstructive sleep apnea currently not using the CPAP  Hyperlipidemia  Recent diagnosis of diabetes mellitus with hemoglobin A1c of 7.6  Normal coronaries on cardiac catheterization  Echocardiogram with normal LV systolic function  Mild to moderate valvular aortic stenosis  Heart rate and blood pressure is somewhat elevated in the office plan to monitor the heart rate and blood pressure at home    Recommendations:  Agree with aggressive risk factor modification regular exercise and weight loss  Continue Toprol-XL  Continue  anticoagulation therapy with Xarelto  Risk benefits and alternatives for long-term oral anticoagulation reviewed and discussed with the patient  Recent cardiac catheterization with no obstructive coronary artery disease  Continued aggressive risk factor modification and medical management  Home blood pressure monitoring  Repeat blood pressure in the office is better  No need for aspirin just continue Xarelto for anticoagulation therapy  Follow-up in office in 6 months  Prior work-up and labs reviewed and discussed with patient    Objective:    Vitals:  Vitals:    08/24/21 1513   BP: 152/95   Pulse: 102   SpO2: 98%   Weight: (!) 137 kg (303 lb)       Physical Exam:    General: Alert, cooperative, no distress, appears stated age  Head:  Normocephalic, atraumatic, mucous membranes moist  Eyes:  Conjunctiva/corneas clear, EOM's intact     Neck:  Supple,  no adenopathy;      Lungs: Clear to auscultation bilaterally, no wheezes rhonchi rales are noted  Chest wall: No tenderness  Heart::  irregular rate and rhythm, S1 and S2 normal, no murmur, rub or gallop  Abdomen: Soft, non-tender, nondistended bowel sounds active  Extremities: No cyanosis, clubbing, or edema  Pulses: 2+ and symmetric all extremities  Skin:  No rashes or lesions  Neuro/psych: A&O x3. CN II through XII are grossly intact with appropriate affect      Allergies:  No Known Allergies    Medication Review:     Current Outpatient Medications:   •  buPROPion XL (WELLBUTRIN XL) 300 MG 24 hr tablet, Take 300 mg by mouth Every Morning., Disp: , Rfl:   •  metFORMIN ER (GLUCOPHAGE-XR) 500 MG 24 hr tablet, Take 500 mg by mouth Daily With Breakfast., Disp: , Rfl:   •  metoprolol succinate XL (TOPROL-XL) 25 MG 24 hr tablet, Take 1 tablet by mouth Daily., Disp: 90 tablet, Rfl: 3  •  rivaroxaban (XARELTO) 20 MG tablet, Take 20 mg by mouth Daily., Disp: , Rfl:   •  venlafaxine XR (EFFEXOR-XR) 75 MG 24 hr capsule, Take 225 mg by mouth Daily. One 150 mg capsule and One 75  mg capsule once daily, Disp: , Rfl:   •  budesonide-formoterol (SYMBICORT) 160-4.5 MCG/ACT inhaler, Inhale 2 puffs 2 (Two) Times a Day As Needed., Disp: , Rfl:     Family History:  Family History   Problem Relation Age of Onset   • Diabetes Mother    • Hypertension Mother    • Heart disease Mother    • Diabetes Father    • Stroke Paternal Grandmother        Past Medical History:  Past Medical History:   Diagnosis Date   • Atrial fibrillation (CMS/HCC)    • Hypertension    • Sleep apnea        Past surgical History:  Past Surgical History:   Procedure Laterality Date   • ANKLE SURGERY Right    • CARDIAC CATHETERIZATION N/A 6/22/2021    Procedure: Left Heart Cath;  Surgeon: Marnie Ramsay MD;  Location:  [facility]AS[facility];  Service: Cardiovascular;  Laterality: N/A;   • CARDIAC CATHETERIZATION N/A 6/22/2021    Procedure: Coronary angiography;  Surgeon: Marnie Ramsay MD;  Location:  [facility];  Service: Cardiovascular;  Laterality: N/A;   • CARDIOVERSION  04/15/2016    Unsuccessful    • SHOULDER SURGERY Left    • TONSILLECTOMY  1956   • TOTAL ABDOMINAL HYSTERECTOMY  10/18/2016       Social History:  Social History     Socioeconomic History   • Marital status:      Spouse name: Not on file   • Number of children: Not on file   • Years of education: Not on file   • Highest education level: Not on file   Tobacco Use   • Smoking status: Never Smoker   • Smokeless tobacco: Never Used   Substance and Sexual Activity   • Alcohol use: Yes   • Drug use: Never       Review of Systems:  The following systems were reviewed as they relate to the cardiovascular system: Constitutional, Eyes, ENT, Cardiovascular, Respiratory, Gastrointestinal, Integumentary, Neurological, Psychiatric, Hematologic, Endocrine, Musculoskeletal, and Genitourinary. The pertinent cardiovascular findings are reported above with all other cardiovascular points within those systems being  negative.    Diagnostic Study Review:     Current Electrocardiogram:    ECG 12 Lead    Date/Time: 8/24/2021 4:18 PM  Performed by: Marnie Ramsay MD  Authorized by: Marnie Ramsay MD   Comparison: compared with previous ECG   Similar to previous ECG  Rhythm: atrial fibrillation  Rate: normal  BPM: 102  Conduction: conduction normal  QRS axis: normal  Other findings: non-specific ST-T wave changes              NOTE: The following portions of the patient's history were reviewed and updated this visit as appropriate: allergies, current medications, past family history, past medical history, past social history, past surgical history and problem list.

## 2022-02-24 ENCOUNTER — OFFICE VISIT (OUTPATIENT)
Dept: CARDIOLOGY | Facility: CLINIC | Age: 72
End: 2022-02-24

## 2022-02-24 VITALS
HEART RATE: 92 BPM | OXYGEN SATURATION: 96 % | SYSTOLIC BLOOD PRESSURE: 142 MMHG | DIASTOLIC BLOOD PRESSURE: 74 MMHG | BODY MASS INDEX: 44.41 KG/M2 | RESPIRATION RATE: 18 BRPM | HEIGHT: 68 IN | WEIGHT: 293 LBS

## 2022-02-24 DIAGNOSIS — I48.11 LONGSTANDING PERSISTENT ATRIAL FIBRILLATION: Primary | ICD-10-CM

## 2022-02-24 DIAGNOSIS — I10 PRIMARY HYPERTENSION: ICD-10-CM

## 2022-02-24 DIAGNOSIS — E78.2 MIXED HYPERLIPIDEMIA: ICD-10-CM

## 2022-02-24 PROCEDURE — 93000 ELECTROCARDIOGRAM COMPLETE: CPT | Performed by: INTERNAL MEDICINE

## 2022-02-24 PROCEDURE — 99214 OFFICE O/P EST MOD 30 MIN: CPT | Performed by: INTERNAL MEDICINE

## 2022-02-24 NOTE — PROGRESS NOTES
Cardiology Office Visit      Encounter Date:  02/24/2022    Patient ID:   Rosanne Hutchins is a 71 y.o. female.    Reason For Followup:  Chronic atrial fibrillation  Hypertension    Brief Clinical History:  Dear Tabby Reilly MD    I had the pleasure of seeing Rosanne Hutchins today. As you are well aware, this is a 71 y.o. female past medical history that is significant for history of hypertension obesity and also history of obstructive sleep apnea currently not on CPAP and also prior history of atrial fibrillation currently on anticoagulation therapy here to establish cardiology care    Interval History:  Patient has been compliant with medical therapy with anticoagulation therapy a  Patient was treated with sotalol and also Rythmol in the past with not being able to maintain the sinus rhythm  She was attempted cardioversion in the past  Currently on rate control and anticoagulation therapy with no significant symptoms from the A. fib standpoint  Thyroid function was normal        Assessment & Plan    Impressions:  Chronic atrial fibrillation  Hypertension  Obesity/Body mass index is 44.01 kg/m².   Obstructive sleep apnea currently not using the CPAP  Hyperlipidemia  diabetes mellitus  Normal coronaries on cardiac catheterization  Echocardiogram with normal LV systolic function  Mild to moderate valvular aortic stenosis    Recommendations:  Agree with aggressive risk factor modification regular exercise and weight loss  Continue Toprol-XL  Continue anticoagulation therapy with Xarelto  Risk benefits and alternatives for long-term oral anticoagulation reviewed and discussed with the patient  Recent cardiac catheterization with no obstructive coronary artery disease  Continued aggressive risk factor modification and medical management  Home blood pressure monitoring  No need for aspirin just continue Xarelto for anticoagulation therapy  Labs with primary care physician office  Follow-up in office in 6 months  Prior  "work-up and labs reviewed and discussed with patient    Objective:    Vitals:  Vitals:    02/24/22 1425   BP: 142/74   BP Location: Left arm   Patient Position: Sitting   Cuff Size: Large Adult   Pulse: 92   Resp: 18   SpO2: 96%   Weight: (!) 145 kg (320 lb)   Height: 172.7 cm (68\")       Physical Exam:    General: Alert, cooperative, no distress, appears stated age  Head:  Normocephalic, atraumatic, mucous membranes moist  Eyes:  Conjunctiva/corneas clear, EOM's intact     Neck:  Supple,  no adenopathy;      Lungs: Clear to auscultation bilaterally, no wheezes rhonchi rales are noted  Chest wall: No tenderness  Heart::  irregular rate and rhythm, S1 and S2 normal, no murmur, rub or gallop  Abdomen: Soft, non-tender, nondistended bowel sounds active  Extremities: No cyanosis, clubbing, or edema  Pulses: 2+ and symmetric all extremities  Skin:  No rashes or lesions  Neuro/psych: A&O x3. CN II through XII are grossly intact with appropriate affect      Allergies:  No Known Allergies    Medication Review:     Current Outpatient Medications:   •  metFORMIN ER (GLUCOPHAGE-XR) 500 MG 24 hr tablet, Take 500 mg by mouth Daily With Breakfast., Disp: , Rfl:   •  metoprolol succinate XL (TOPROL-XL) 25 MG 24 hr tablet, Take 1 tablet by mouth Daily., Disp: 90 tablet, Rfl: 3  •  rivaroxaban (XARELTO) 20 MG tablet, Take 20 mg by mouth Daily., Disp: , Rfl:   •  venlafaxine XR (EFFEXOR-XR) 75 MG 24 hr capsule, Take 225 mg by mouth Daily. One 150 mg capsule and One 75 mg capsule once daily, Disp: , Rfl:   •  buPROPion XL (WELLBUTRIN XL) 300 MG 24 hr tablet, Take 300 mg by mouth Every Morning., Disp: , Rfl:     Family History:  Family History   Problem Relation Age of Onset   • Diabetes Mother    • Hypertension Mother    • Heart disease Mother    • Diabetes Father    • Stroke Paternal Grandmother        Past Medical History:  Past Medical History:   Diagnosis Date   • Atrial fibrillation (HCC)    • Hypertension    • Sleep apnea  "       Past surgical History:  Past Surgical History:   Procedure Laterality Date   • ANKLE SURGERY Right    • CARDIAC CATHETERIZATION N/A 6/22/2021    Procedure: Left Heart Cath;  Surgeon: Marnie Ramsay MD;  Location: [facility] CATH INVASIVE LOCATION;  Service: Cardiovascular;  Laterality: N/A;   • CARDIAC CATHETERIZATION N/A 6/22/2021    Procedure: Coronary angiography;  Surgeon: Marnie Ramsay MD;  Location: [facility] CATH INVASIVE LOCATION;  Service: Cardiovascular;  Laterality: N/A;   • CARDIOVERSION  04/15/2016    Unsuccessful    • SHOULDER SURGERY Left    • TONSILLECTOMY  1956   • TOTAL ABDOMINAL HYSTERECTOMY  10/18/2016       Social History:  Social History     Socioeconomic History   • Marital status:    Tobacco Use   • Smoking status: Never Smoker   • Smokeless tobacco: Never Used   Vaping Use   • Vaping Use: Never used   Substance and Sexual Activity   • Alcohol use: Yes   • Drug use: Never       Review of Systems:  The following systems were reviewed as they relate to the cardiovascular system: Constitutional, Eyes, ENT, Cardiovascular, Respiratory, Gastrointestinal, Integumentary, Neurological, Psychiatric, Hematologic, Endocrine, Musculoskeletal, and Genitourinary. The pertinent cardiovascular findings are reported above with all other cardiovascular points within those systems being negative.    Diagnostic Study Review:     Current Electrocardiogram:    ECG 12 Lead    Date/Time: 2/24/2022 5:05 PM  Performed by: Marnie Ramsay MD  Authorized by: Marnie Ramsay MD   Comparison: compared with previous ECG   Similar to previous ECG  Rhythm: atrial fibrillation  Rate: normal  BPM: 100  Conduction: conduction normal  QRS axis: normal  Other findings: non-specific ST-T wave changes    Clinical impression: abnormal EKG              NOTE: The following portions of the patient's history were reviewed and updated this visit as appropriate: allergies, current medications,  past family history, past medical history, past social history, past surgical history and problem list.

## 2022-04-25 RX ORDER — METOPROLOL SUCCINATE 25 MG/1
TABLET, EXTENDED RELEASE ORAL
Qty: 90 TABLET | Refills: 3 | Status: SHIPPED | OUTPATIENT
Start: 2022-04-25 | End: 2023-03-07 | Stop reason: SDUPTHER

## 2022-05-25 ENCOUNTER — TELEPHONE (OUTPATIENT)
Dept: CARDIOLOGY | Facility: CLINIC | Age: 72
End: 2022-05-25

## 2022-05-25 NOTE — TELEPHONE ENCOUNTER
Patient called and states she believes she is having some vertigo. She states her daughter who is an RN told her it could be allergies or maybe something else.   I advised her to contact her PCP to rule out an inner ear infection or fluid behind the eardrum.  She verbalized understanding.

## 2022-07-25 RX ORDER — RIVAROXABAN 20 MG/1
TABLET, FILM COATED ORAL
Qty: 90 TABLET | Refills: 1 | Status: SHIPPED | OUTPATIENT
Start: 2022-07-25 | End: 2023-01-30 | Stop reason: SDUPTHER

## 2022-08-25 ENCOUNTER — OFFICE VISIT (OUTPATIENT)
Dept: CARDIOLOGY | Facility: CLINIC | Age: 72
End: 2022-08-25

## 2022-08-25 VITALS
OXYGEN SATURATION: 91 % | SYSTOLIC BLOOD PRESSURE: 138 MMHG | BODY MASS INDEX: 44.41 KG/M2 | HEIGHT: 68 IN | HEART RATE: 87 BPM | WEIGHT: 293 LBS | DIASTOLIC BLOOD PRESSURE: 80 MMHG | RESPIRATION RATE: 18 BRPM

## 2022-08-25 DIAGNOSIS — I48.11 LONGSTANDING PERSISTENT ATRIAL FIBRILLATION: Primary | ICD-10-CM

## 2022-08-25 DIAGNOSIS — I48.3 TYPICAL ATRIAL FLUTTER: ICD-10-CM

## 2022-08-25 DIAGNOSIS — E78.2 MIXED HYPERLIPIDEMIA: ICD-10-CM

## 2022-08-25 DIAGNOSIS — R94.30 ABNORMAL RESULTS OF CARDIOVASCULAR FUNCTION STUDIES: ICD-10-CM

## 2022-08-25 DIAGNOSIS — I10 ESSENTIAL HYPERTENSION: ICD-10-CM

## 2022-08-25 DIAGNOSIS — I10 PRIMARY HYPERTENSION: ICD-10-CM

## 2022-08-25 DIAGNOSIS — I35.0 NONRHEUMATIC AORTIC VALVE STENOSIS: ICD-10-CM

## 2022-08-25 DIAGNOSIS — I20.8 STABLE ANGINA PECTORIS: ICD-10-CM

## 2022-08-25 PROCEDURE — 99214 OFFICE O/P EST MOD 30 MIN: CPT | Performed by: INTERNAL MEDICINE

## 2022-08-25 PROCEDURE — 93000 ELECTROCARDIOGRAM COMPLETE: CPT | Performed by: INTERNAL MEDICINE

## 2022-08-25 NOTE — PROGRESS NOTES
Cardiology Office Visit      Encounter Date:  08/25/2022    Patient ID:   Rosanne Hutchins is a 71 y.o. female.      Reason For Followup:  Chronic atrial fibrillation  Hypertension    Brief Clinical History:  Dear Tabby Reilly MD    I had the pleasure of seeing Rosanne Hutchins today. As you are well aware, this is a 71 y.o. female past medical history that is significant for history of hypertension obesity and also history of obstructive sleep apnea currently not on CPAP and also prior history of atrial fibrillation currently on anticoagulation therapy here to establish cardiology care    Interval History:  Patient has been compliant with medical therapy with anticoagulation therapy   Patient was treated with sotalol and also Rythmol in the past with not being able to maintain the sinus rhythm  Failed cardioversion in the past  Currently on rate control and anticoagulation therapy with no significant symptoms from the A. fib standpoint  Denies any new cardiac symptoms  Tolerating anticoagulation therapy      Assessment & Plan    Impressions:  Chronic atrial fibrillation  Hypertension  Obesity/Body mass index is 44.01 kg/m².   Obstructive sleep apnea currently not using the CPAP  Hyperlipidemia  diabetes mellitus  Normal coronaries on cardiac catheterization  Echocardiogram with normal LV systolic function  Mild to moderate valvular aortic stenosis    Recommendations:  Agree with aggressive risk factor modification regular exercise and weight loss  Continue Toprol-XL  Continue anticoagulation therapy with Xarelto  Risk benefits and alternatives for long-term oral anticoagulation reviewed and discussed with the patient  Recent cardiac catheterization with no obstructive coronary artery disease  Continued aggressive risk factor modification and medical management  Home blood pressure monitoring  No need for aspirin just continue Xarelto for anticoagulation therapy  Continue current medical therapy with Toprol-XL 25 mg  "p.o. once a day Xarelto 20 mg p.o. once a day  Labs with primary care physician office  Follow-up in office in 6 months  Check labs including CBC CMP and lipids  Prior work-up and labs reviewed and discussed with patient  Objective:    Vitals:  Vitals:    08/25/22 1356   BP: 138/80   BP Location: Left arm   Patient Position: Sitting   Cuff Size: Large Adult   Pulse: 87   Resp: 18   SpO2: 91%   Weight: (!) 156 kg (343 lb)   Height: 172.7 cm (68\")       Physical Exam:    General: Alert, cooperative, no distress, appears stated age  Head:  Normocephalic, atraumatic, mucous membranes moist  Eyes:  Conjunctiva/corneas clear, EOM's intact     Neck:  Supple,  no adenopathy;      Lungs: Clear to auscultation bilaterally, no wheezes rhonchi rales are noted  Chest wall: No tenderness  Heart::  Regular rate and rhythm, S1 and S2 normal, no murmur, rub or gallop  Abdomen: Soft, non-tender, nondistended bowel sounds active  Extremities: No cyanosis, clubbing, or edema  Pulses: 2+ and symmetric all extremities  Skin:  No rashes or lesions  Neuro/psych: A&O x3. CN II through XII are grossly intact with appropriate affect      Allergies:  No Known Allergies    Medication Review:     Current Outpatient Medications:   •  buPROPion XL (WELLBUTRIN XL) 300 MG 24 hr tablet, Take 300 mg by mouth Every Morning., Disp: , Rfl:   •  metFORMIN ER (GLUCOPHAGE-XR) 500 MG 24 hr tablet, Take 500 mg by mouth Daily With Breakfast., Disp: , Rfl:   •  metoprolol succinate XL (TOPROL-XL) 25 MG 24 hr tablet, TAKE ONE TABLET BY MOUTH DAILY, Disp: 90 tablet, Rfl: 3  •  venlafaxine XR (EFFEXOR-XR) 75 MG 24 hr capsule, Take 225 mg by mouth Daily. One 150 mg capsule and One 75 mg capsule once daily, Disp: , Rfl:   •  Xarelto 20 MG tablet, TAKE ONE TABLET BY MOUTH EVERY MORNING, Disp: 90 tablet, Rfl: 1    Family History:  Family History   Problem Relation Age of Onset   • Diabetes Mother    • Hypertension Mother    • Heart disease Mother    • Diabetes Father  "   • Stroke Paternal Grandmother        Past Medical History:  Past Medical History:   Diagnosis Date   • Atrial fibrillation (HCC)    • Hypertension    • Sleep apnea        Past surgical History:  Past Surgical History:   Procedure Laterality Date   • ANKLE SURGERY Right    • CARDIAC CATHETERIZATION N/A 6/22/2021    Procedure: Left Heart Cath;  Surgeon: Marnie Ramsay MD;  Location: [facility] CATH INVASIVE LOCATION;  Service: Cardiovascular;  Laterality: N/A;   • CARDIAC CATHETERIZATION N/A 6/22/2021    Procedure: Coronary angiography;  Surgeon: Marnei Ramsay MD;  Location: [facility] CATH INVASIVE LOCATION;  Service: Cardiovascular;  Laterality: N/A;   • CARDIOVERSION  04/15/2016    Unsuccessful    • SHOULDER SURGERY Left    • TONSILLECTOMY  1956   • TOTAL ABDOMINAL HYSTERECTOMY  10/18/2016       Social History:  Social History     Socioeconomic History   • Marital status:    Tobacco Use   • Smoking status: Never Smoker   • Smokeless tobacco: Never Used   Vaping Use   • Vaping Use: Never used   Substance and Sexual Activity   • Alcohol use: Yes   • Drug use: Never       Review of Systems:  The following systems were reviewed as they relate to the cardiovascular system: Constitutional, Eyes, ENT, Cardiovascular, Respiratory, Gastrointestinal, Integumentary, Neurological, Psychiatric, Hematologic, Endocrine, Musculoskeletal, and Genitourinary. The pertinent cardiovascular findings are reported above with all other cardiovascular points within those systems being negative.    Diagnostic Study Review:     Current Electrocardiogram:    ECG 12 Lead    Date/Time: 8/25/2022 4:30 PM  Performed by: Marnie Ramsay MD  Authorized by: Marnie Ramsay MD   Comparison: compared with previous ECG   Similar to previous ECG  Rhythm: atrial fibrillation  Rate: normal  BPM: 87  Conduction: conduction normal  QRS axis: normal  Other findings: non-specific ST-T wave changes    Clinical impression:  abnormal EKG              NOTE: The following portions of the patient's history were reviewed and updated this visit as appropriate: allergies, current medications, past family history, past medical history, past social history, past surgical history and problem list.

## 2023-01-30 NOTE — TELEPHONE ENCOUNTER
Caller: Rosanne Hutchins    Relationship: Self    Best call back number: [phone]    Requested Prescriptions:   Requested Prescriptions     Pending Prescriptions Disp Refills   • rivaroxaban (Xarelto) 20 MG tablet 90 tablet 1     Sig: Take 1 tablet by mouth Every Morning.        Pharmacy where request should be sent: [id number] [address] [facility] - [phone]  - [phone] FX         Does the patient have less than a 3 day supply:  [x] Yes  [] No    Would you like a call back once the refill request has been completed: [x] Yes [] No    If the office needs to give you a call back, can they leave a voicemail: [x] Yes [] No    Celestino Nelson Rep   01/30/23 15:57 EST

## 2023-03-07 ENCOUNTER — OFFICE VISIT (OUTPATIENT)
Dept: CARDIOLOGY | Facility: CLINIC | Age: 73
End: 2023-03-07
Payer: MEDICARE

## 2023-03-07 VITALS
SYSTOLIC BLOOD PRESSURE: 142 MMHG | DIASTOLIC BLOOD PRESSURE: 78 MMHG | OXYGEN SATURATION: 99 % | HEART RATE: 102 BPM | WEIGHT: 293 LBS | HEIGHT: 68 IN | BODY MASS INDEX: 44.41 KG/M2

## 2023-03-07 DIAGNOSIS — E78.2 MIXED HYPERLIPIDEMIA: ICD-10-CM

## 2023-03-07 DIAGNOSIS — I10 PRIMARY HYPERTENSION: ICD-10-CM

## 2023-03-07 DIAGNOSIS — I48.11 LONGSTANDING PERSISTENT ATRIAL FIBRILLATION: ICD-10-CM

## 2023-03-07 DIAGNOSIS — I35.0 NONRHEUMATIC AORTIC VALVE STENOSIS: Primary | ICD-10-CM

## 2023-03-07 PROCEDURE — 93000 ELECTROCARDIOGRAM COMPLETE: CPT | Performed by: INTERNAL MEDICINE

## 2023-03-07 PROCEDURE — 99214 OFFICE O/P EST MOD 30 MIN: CPT | Performed by: INTERNAL MEDICINE

## 2023-03-07 RX ORDER — METOPROLOL SUCCINATE 25 MG/1
25 TABLET, EXTENDED RELEASE ORAL DAILY
Qty: 90 TABLET | Refills: 3 | Status: SHIPPED | OUTPATIENT
Start: 2023-03-07

## 2023-03-07 NOTE — PROGRESS NOTES
Cardiology Office Visit      Encounter Date:  03/07/2023    Patient ID:   Rosanne Hutchins is a 72 y.o. female.      Reason For Followup:  Chronic atrial fibrillation  Hypertension    Brief Clinical History:  Dear Tabby Reilly MD    I had the pleasure of seeing Rosanne Hutchins today. As you are well aware, this is a 72 y.o. female past medical history that is significant for history of hypertension obesity and also history of obstructive sleep apnea currently not on CPAP and also prior history of atrial fibrillation currently on anticoagulation therapy here to establish cardiology care    Interval History:  Patient has been compliant with medical therapy with anticoagulation therapy   Currently on rate control and anticoagulation therapy with no significant symptoms from the A. fib standpoint  Denies any new cardiac symptoms  Tolerating anticoagulation therapy      Assessment & Plan    Impressions:  Chronic atrial fibrillation  Hypertension  Obesity/Body mass index is 44.01 kg/m².   Obstructive sleep apnea currently not using the CPAP  Hyperlipidemia  diabetes mellitus  Normal coronaries on cardiac catheterization  Echocardiogram with normal LV systolic function  Mild to moderate valvular aortic stenosis    Recommendations:  Agree with aggressive risk factor modification regular exercise and weight loss  Continue Toprol-XL  Continue anticoagulation therapy with Xarelto  Risk benefits and alternatives for long-term oral anticoagulation reviewed and discussed with the patient  Recent cardiac catheterization with no obstructive coronary artery disease  Continued aggressive risk factor modification and medical management  Home blood pressure monitoring  No need for aspirin just continue Xarelto for anticoagulation therapy  Continue current medical therapy with Toprol-XL 25 mg p.o. once a day Xarelto 20 mg p.o. once a day  Benefits and alternatives for anticoagulation reviewed and discussed with patient  Labs with  "primary care physician office  Follow-up in office in 6 months  Labs with primary care physician office  Repeat echocardiogram to assess the severity of the aortic stenosis  Prior work-up and labs reviewed and discussed with patient    Objective:    Vitals:  Vitals:    03/07/23 1306   BP: 142/78   Pulse: 102   SpO2: 99%   Weight: (!) 156 kg (343 lb)   Height: 172.7 cm (68\")       Physical Exam:    General: Alert, cooperative, no distress, appears stated age  Head:  Normocephalic, atraumatic, mucous membranes moist  Eyes:  Conjunctiva/corneas clear, EOM's intact     Neck:  Supple,  no adenopathy;      Lungs: Clear to auscultation bilaterally, no wheezes rhonchi rales are noted  Chest wall: No tenderness  Heart::  Irregular rate and rhythm, S1 and S2 normal, no murmur, rub or gallop  Abdomen: Soft, non-tender, nondistended bowel sounds active  Extremities: No cyanosis, clubbing, or edema  Pulses: 2+ and symmetric all extremities  Skin:  No rashes or lesions  Neuro/psych: A&O x3. CN II through XII are grossly intact with appropriate affect      Allergies:  Allergies   Allergen Reactions   • Augmentin [Amoxicillin-Pot Clavulanate] GI Intolerance       Medication Review:     Current Outpatient Medications:   •  metFORMIN ER (GLUCOPHAGE-XR) 500 MG 24 hr tablet, Take 1 tablet by mouth Daily With Breakfast., Disp: , Rfl:   •  metoprolol succinate XL (TOPROL-XL) 25 MG 24 hr tablet, Take 1 tablet by mouth Daily., Disp: 90 tablet, Rfl: 3  •  rivaroxaban (Xarelto) 20 MG tablet, Take 1 tablet by mouth Every Morning., Disp: 90 tablet, Rfl: 3  •  venlafaxine XR (EFFEXOR-XR) 75 MG 24 hr capsule, Take 3 capsules by mouth Daily. One 150 mg capsule and One 75 mg capsule once daily, Disp: , Rfl:     Family History:  Family History   Problem Relation Age of Onset   • Diabetes Mother    • Hypertension Mother    • Heart disease Mother    • Diabetes Father    • Stroke Paternal Grandmother        Past Medical History:  Past Medical History: "   Diagnosis Date   • Atrial fibrillation (HCC)    • Hypertension    • Sleep apnea        Past surgical History:  Past Surgical History:   Procedure Laterality Date   • ANKLE SURGERY Right    • CARDIAC CATHETERIZATION N/A 6/22/2021    Procedure: Left Heart Cath;  Surgeon: Marnie Ramsay MD;  Location: [facility] CATH INVASIVE LOCATION;  Service: Cardiovascular;  Laterality: N/A;   • CARDIAC CATHETERIZATION N/A 6/22/2021    Procedure: Coronary angiography;  Surgeon: Marnie Ramsay MD;  Location: [facility] CATH INVASIVE LOCATION;  Service: Cardiovascular;  Laterality: N/A;   • CARDIOVERSION  04/15/2016    Unsuccessful    • SHOULDER SURGERY Left    • TONSILLECTOMY  1956   • TOTAL ABDOMINAL HYSTERECTOMY  10/18/2016       Social History:  Social History     Socioeconomic History   • Marital status:    Tobacco Use   • Smoking status: Never   • Smokeless tobacco: Never   Vaping Use   • Vaping Use: Never used   Substance and Sexual Activity   • Alcohol use: Yes   • Drug use: Never       Review of Systems:  The following systems were reviewed as they relate to the cardiovascular system: Constitutional, Eyes, ENT, Cardiovascular, Respiratory, Gastrointestinal, Integumentary, Neurological, Psychiatric, Hematologic, Endocrine, Musculoskeletal, and Genitourinary. The pertinent cardiovascular findings are reported above with all other cardiovascular points within those systems being negative.    Diagnostic Study Review:     Current Electrocardiogram:    ECG 12 Lead    Date/Time: 3/7/2023 3:15 PM  Performed by: Marnie Ramsay MD  Authorized by: Marnie Ramsay MD   Comparison: compared with previous ECG   Similar to previous ECG  Rhythm: atrial fibrillation  Rate: normal  BPM: 98  Conduction: conduction normal  QRS axis: normal  Other findings: non-specific ST-T wave changes    Clinical impression: abnormal EKG              NOTE: The following portions of the patient's history were reviewed and  updated this visit as appropriate: allergies, current medications, past family history, past medical history, past social history, past surgical history and problem list.

## 2023-04-03 ENCOUNTER — HOSPITAL ENCOUNTER (OUTPATIENT)
Dept: CARDIOLOGY | Facility: HOSPITAL | Age: 73
Discharge: HOME | End: 2023-04-03
Admitting: INTERNAL MEDICINE
Payer: MEDICARE

## 2023-04-03 VITALS
SYSTOLIC BLOOD PRESSURE: 148 MMHG | WEIGHT: 293 LBS | BODY MASS INDEX: 44.41 KG/M2 | HEIGHT: 68 IN | DIASTOLIC BLOOD PRESSURE: 81 MMHG | HEART RATE: 81 BPM

## 2023-04-03 DIAGNOSIS — I35.0 NONRHEUMATIC AORTIC VALVE STENOSIS: ICD-10-CM

## 2023-04-03 LAB
BH CV ECHO MEAS - ACS: 1.94 CM
BH CV ECHO MEAS - AI P1/2T: 530.6 MSEC
BH CV ECHO MEAS - AO MAX PG: 5.1 MMHG
BH CV ECHO MEAS - AO MEAN PG: 3 MMHG
BH CV ECHO MEAS - AO ROOT DIAM: 3.3 CM
BH CV ECHO MEAS - AO V2 MAX: 113.3 CM/SEC
BH CV ECHO MEAS - AO V2 VTI: 21.9 CM
BH CV ECHO MEAS - AVA(I,D): 3.7 CM2
BH CV ECHO MEAS - EDV(CUBED): 117.9 ML
BH CV ECHO MEAS - EDV(MOD-SP4): 92.7 ML
BH CV ECHO MEAS - EF(MOD-BP): 56.3 %
BH CV ECHO MEAS - EF(MOD-SP4): 56.3 %
BH CV ECHO MEAS - ESV(CUBED): 41.2 ML
BH CV ECHO MEAS - ESV(MOD-SP4): 40.5 ML
BH CV ECHO MEAS - FS: 29.6 %
BH CV ECHO MEAS - IVS/LVPW: 1.12 CM
BH CV ECHO MEAS - IVSD: 1.15 CM
BH CV ECHO MEAS - LA DIMENSION: 4.7 CM
BH CV ECHO MEAS - LV MASS(C)D: 197.2 GRAMS
BH CV ECHO MEAS - LV MAX PG: 4.7 MMHG
BH CV ECHO MEAS - LV MEAN PG: 2.49 MMHG
BH CV ECHO MEAS - LV V1 MAX: 107.9 CM/SEC
BH CV ECHO MEAS - LV V1 VTI: 23.1 CM
BH CV ECHO MEAS - LVIDD: 4.9 CM
BH CV ECHO MEAS - LVIDS: 3.5 CM
BH CV ECHO MEAS - LVOT AREA: 3.5 CM2
BH CV ECHO MEAS - LVOT DIAM: 2.11 CM
BH CV ECHO MEAS - LVPWD: 1.02 CM
BH CV ECHO MEAS - MR MAX PG: 81.5 MMHG
BH CV ECHO MEAS - MR MAX VEL: 449.9 CM/SEC
BH CV ECHO MEAS - MV DEC TIME: 0.18 MSEC
BH CV ECHO MEAS - MV E MAX VEL: 88 CM/SEC
BH CV ECHO MEAS - MV MAX PG: 3.9 MMHG
BH CV ECHO MEAS - MV MEAN PG: 1.45 MMHG
BH CV ECHO MEAS - MV V2 VTI: 20.7 CM
BH CV ECHO MEAS - MVA(VTI): 3.9 CM2
BH CV ECHO MEAS - PA V2 MAX: 91.4 CM/SEC
BH CV ECHO MEAS - PI END-D VEL: 139.3 CM/SEC
BH CV ECHO MEAS - PULM DIAS VEL: 27.7 CM/SEC
BH CV ECHO MEAS - PULM S/D: 2.28
BH CV ECHO MEAS - PULM SYS VEL: 63.2 CM/SEC
BH CV ECHO MEAS - RV MAX PG: 2.22 MMHG
BH CV ECHO MEAS - RV V1 MAX: 74.4 CM/SEC
BH CV ECHO MEAS - RV V1 VTI: 14.8 CM
BH CV ECHO MEAS - RVDD: 2.9 CM
BH CV ECHO MEAS - SV(LVOT): 80.9 ML
BH CV ECHO MEAS - SV(MOD-SP4): 52.2 ML
BH CV ECHO MEAS - TR MAX PG: 19.6 MMHG
BH CV ECHO MEAS - TR MAX VEL: 220.8 CM/SEC
MAXIMAL PREDICTED HEART RATE: 149 BPM
STRESS TARGET HR: 127 BPM

## 2023-04-03 PROCEDURE — 93306 TTE W/DOPPLER COMPLETE: CPT | Performed by: INTERNAL MEDICINE

## 2023-04-03 PROCEDURE — 93306 TTE W/DOPPLER COMPLETE: CPT

## 2023-04-04 ENCOUNTER — TELEPHONE (OUTPATIENT)
Dept: CARDIOLOGY | Facility: CLINIC | Age: 73
End: 2023-04-04
Payer: MEDICARE

## 2023-04-04 NOTE — TELEPHONE ENCOUNTER
Called and notified, patient verbalized understanding    ------------------------    Marnie Ramsay MD Melissa, MA      Echocardiogram looks good   Valvular heart disease looks better than before   Continue same therapy and follow-up as scheduled

## 2023-06-09 RX ORDER — METOPROLOL SUCCINATE 25 MG/1
25 TABLET, EXTENDED RELEASE ORAL DAILY
Qty: 90 TABLET | Refills: 3 | Status: SHIPPED | OUTPATIENT
Start: 2023-06-09

## 2023-06-09 NOTE — TELEPHONE ENCOUNTER
Caller: LisetRosanne    Relationship: Self    Best call back number: [phone]    Requested Prescriptions:   Requested Prescriptions     Pending Prescriptions Disp Refills    metoprolol succinate XL (TOPROL-XL) 25 MG 24 hr tablet 90 tablet 3     Sig: Take 1 tablet by mouth Daily.        Pharmacy where request should be sent: [facility] [id number] [address] [facility] - [phone]  - [phone]     Last office visit with prescribing clinician: 3/7/2023   Last telemedicine visit with prescribing clinician: Visit date not found   Next office visit with prescribing clinician: 9/19/2023     Additional details provided by patient: CURRENTLY OUT OF MEDICATION. IT SHOWS ZERO REFILL, PLEASE MAKE SURE REFILLS ARE AVAILABLE FOR MEDICATION.    Does the patient have less than a 3 day supply:  [x] Yes  [] No    Would you like a call back once the refill request has been completed: [] Yes [] No    If the office needs to give you a call back, can they leave a voicemail: [] Yes [] No    Celestino Riley Rep   06/09/23 13:46 EDT

## 2023-09-19 ENCOUNTER — OFFICE VISIT (OUTPATIENT)
Dept: CARDIOLOGY | Facility: CLINIC | Age: 73
End: 2023-09-19
Payer: MEDICARE

## 2023-09-19 VITALS
DIASTOLIC BLOOD PRESSURE: 88 MMHG | HEIGHT: 68 IN | OXYGEN SATURATION: 98 % | SYSTOLIC BLOOD PRESSURE: 119 MMHG | WEIGHT: 293 LBS | BODY MASS INDEX: 44.41 KG/M2 | HEART RATE: 73 BPM

## 2023-09-19 DIAGNOSIS — I48.11 LONGSTANDING PERSISTENT ATRIAL FIBRILLATION: ICD-10-CM

## 2023-09-19 DIAGNOSIS — I10 PRIMARY HYPERTENSION: ICD-10-CM

## 2023-09-19 DIAGNOSIS — I35.0 NONRHEUMATIC AORTIC VALVE STENOSIS: Primary | ICD-10-CM

## 2023-09-19 DIAGNOSIS — E78.2 MIXED HYPERLIPIDEMIA: ICD-10-CM

## 2023-09-19 PROCEDURE — 99214 OFFICE O/P EST MOD 30 MIN: CPT | Performed by: INTERNAL MEDICINE

## 2023-09-19 PROCEDURE — 3079F DIAST BP 80-89 MM HG: CPT | Performed by: INTERNAL MEDICINE

## 2023-09-19 PROCEDURE — 1160F RVW MEDS BY RX/DR IN RCRD: CPT | Performed by: INTERNAL MEDICINE

## 2023-09-19 PROCEDURE — 3074F SYST BP LT 130 MM HG: CPT | Performed by: INTERNAL MEDICINE

## 2023-09-19 PROCEDURE — 93000 ELECTROCARDIOGRAM COMPLETE: CPT | Performed by: INTERNAL MEDICINE

## 2023-09-19 PROCEDURE — 1159F MED LIST DOCD IN RCRD: CPT | Performed by: INTERNAL MEDICINE

## 2023-09-19 NOTE — PROGRESS NOTES
Cardiology Office Visit      Encounter Date:  09/19/2023    Patient ID:   Rosanne Hutchins is a 72 y.o. female.      Reason For Followup:  Chronic atrial fibrillation  Hypertension    Brief Clinical History:  Dear Tabby Reilly MD    I had the pleasure of seeing Rosanne Hutchins today. As you are well aware, this is a 72 y.o. female past medical history that is significant for history of hypertension obesity and also history of obstructive sleep apnea currently not on CPAP and also prior history of atrial fibrillation currently on anticoagulation therapy here to establish cardiology care    Interval History:  Patient has been compliant with medical therapy with anticoagulation therapy   Currently on rate control and anticoagulation therapy with no significant symptoms from the A. fib standpoint  Denies any new cardiac symptoms  Tolerating anticoagulation therapy      Assessment & Plan    Impressions:  Chronic atrial fibrillation  Hypertension  Obesity/Body mass index is 44.01 kg/m². /Body mass index is 52 kg/m².   Obstructive sleep apnea currently not using the CPAP  Hyperlipidemia  diabetes mellitus  Normal coronaries on cardiac catheterization  Echocardiogram with normal LV systolic function  Mild  valvular aortic stenosis  Venous edema bilateral lower extremities    Recommendations:  Recommend  aggressive risk factor modification regular exercise and weight loss  Continue Toprol-XL  Continue anticoagulation therapy with Xarelto  Risk benefits and alternatives for long-term oral anticoagulation reviewed and discussed with the patient  Recent cardiac catheterization with no obstructive coronary artery disease  Continued aggressive risk factor modification and medical management  Home blood pressure monitoring  No need for aspirin just continue Xarelto for anticoagulation therapy  Continue current medical therapy with Toprol-XL 25 mg p.o. once a day Xarelto 20 mg p.o. once a day  Benefits and alternatives for  anticoagulation reviewed and discussed with patient  Labs with primary care physician office  Follow-up in office in 6 months  Labs with primary care physician office  Patient likes to try a GLP 1 agonist to help with the weight loss   Patient is advised to discuss with the primary care physician about starting the GLP-1 agonist to help with weight loss  From cardiac standpoint patient is stable to try the medication  Risk benefits and alternatives reviewed and discussed the patient  Results of the echocardiogram reviewed and discussed with the patient  Prior work-up and labs reviewed and discussed with patient            Lab Results   Component Value Date    GLUCOSE 119 (H) 06/22/2021    BUN 15 06/22/2021    CREATININE 1.09 (H) 06/22/2021    BCR 13.8 06/22/2021    K 3.8 06/22/2021    CO2 24.0 06/22/2021    CALCIUM 8.8 06/22/2021     Results for orders placed during the hospital encounter of 04/03/23    Adult Transthoracic Echo Complete W/ Cont if Necessary Per Protocol    Interpretation Summary    Left ventricular systolic function is normal. Calculated left ventricular EF = 56.3% Left ventricular ejection fraction appears to be 56 - 60%.    Left ventricular wall thickness is consistent with mild concentric hypertrophy.    Left ventricular diastolic function is consistent with (grade II w/high LAP) pseudonormalization.    The right atrial cavity is mildly  dilated.    There is calcification of the aortic valve mainly affecting the left coronary and right coronary cusp(s).    Estimated right ventricular systolic pressure from tricuspid regurgitation is normal (<35 mmHg).     Results for orders placed during the hospital encounter of 06/22/21    Cardiac Catheterization/Vascular Study    Narrative  Table formatting from the original result was not included.  Cardiac Catheterization Operative Report    Rosanne Hutchins  [id number]  6/22/2021  @PCP@    She underwent cardiac catheterization.    Indications for the  procedure include: abnormal stress test.    Procedure Details:  The risks, benefits, complications, treatment options, and expected outcomes were discussed with the patient. The patient and/or family concurred with the proposed plan, giving informed consent.    After informed consent the patient was brought to the cath lab after appropriate IV hydration was begun and oral premedication was given. She was further sedated with fentanyl. She was prepped and draped in the usual manner. Using the modified Seldinger access technique, a 6 [place] sheath was placed in the femoral artery. A left heart catheterization with coronary arteriography was performed. Findings are discussed below.    After the procedure was completed, sedation was stopped and the sheaths and catheters were all removed. Hemostasis was achieved per established hospital protocols.    Conscious sedation:  Conscious sedation was performed according to protocol.  I supervised and directed an independent trained observer with the assistance of monitoring the patient's level of consciousness and physiologic status throughout the procedure.  Intraoperative service time was 30 minutes.    Findings:    Hemodynamics  Central artery pressure systolic 110 and diastolic 60 mmHg  Left Main  left main is angiographically normal, left main bifurcates into left and descending and left circumflex arteries  RCA  large dominant vessel angiographically normal.  Coronary artery provides a moderate size PDA and PLB branches that are angiographically normal.  LAD  large-caliber vessel provides mild moderate-sized diagonal branch both LAD and diagonal branch did not have any significant obstructive coronary artery disease.  LAD also provides a second diagonal branch that is angiographically normal but small in size.  LAD has a long segment of intramyocardial bridging involving the midportion.  Circ  large-caliber vessel angiographically normal provides 1 moderate to large size  "marginal branch that is also angiographically normal  LV  not done  Coronary Dominance  right coronary artery    Estimated Blood Loss:  Minimal    Specimens: None    Complications:  None; patient tolerated the procedure well.    Disposition: PACU - hemodynamically stable.    Condition: stable    Impressions:  Normal coronaries    Recommendations:  Work-up for noncardiac etiology for patient's symptoms  Test results were reviewed and discussed with the patient and family     No results found for: CHOL, CHLPL, TRIG, HDL, LDL, LDLDIRECT   Results for orders placed during the hospital encounter of 06/16/21    Stress Test With Myocardial Perfusion One Day    Interpretation Summary  · Abnormal medical perfusion study  · Large area of severe intensity perfusion defect involving the anterior anteroapical wall and also moderate size reversible ischemia involving the inferior wall  · Left ventricular ejection fraction is normal. (Calculated EF = 51%).  · Impressions are consistent with a high risk study.  · Clinical correlation is recommended   Results for orders placed in visit on 04/15/16    CARDIOVASCULAR STUDIES - CONVERTED    Narrative  Cardioversion      Imported By: Roxanne Weber 4/25/2016 10:45:41 AM    _____________________________________________________________________    External Attachment:    Type: Image  Comment:  External Document      Signed before import by Wade Hunter MD  Filed automatically on 04/25/2016 at 10:46 AM  ________________________________________________________________________      Disclaimer: Converted Note message may not contain all data elements that existed in the legacy source system. Please see [employer] [facility] for the original note details.           Objective:    Vitals:  Vitals:    09/19/23 1253   BP: 119/88   BP Location: Left arm   Patient Position: Sitting   Pulse: 73   SpO2: 98%   Weight: (!) 155 kg (342 lb)   Height: 172.7 cm (68\")       Physical " Exam:    General: Alert, cooperative, no distress, appears stated age  Head:  Normocephalic, atraumatic, mucous membranes moist  Eyes:  Conjunctiva/corneas clear, EOM's intact     Neck:  Supple,  no adenopathy;      Lungs: Clear to auscultation bilaterally, no wheezes rhonchi rales are noted  Chest wall: No tenderness  Heart::  Regular rate and rhythm, S1 and S2 normal, no murmur, rub or gallop  Abdomen: Soft, non-tender, nondistended bowel sounds active  Extremities: No cyanosis, clubbing, or edema  Pulses: 2+ and symmetric all extremities  Skin:  No rashes or lesions  Neuro/psych: A&O x3. CN II through XII are grossly intact with appropriate affect      Allergies:  Allergies   Allergen Reactions    Augmentin [Amoxicillin-Pot Clavulanate] GI Intolerance       Medication Review:     Current Outpatient Medications:     metFORMIN ER (GLUCOPHAGE-XR) 500 MG 24 hr tablet, Take 1 tablet by mouth Daily With Breakfast., Disp: , Rfl:     metoprolol succinate XL (TOPROL-XL) 25 MG 24 hr tablet, Take 1 tablet by mouth Daily., Disp: 90 tablet, Rfl: 3    rivaroxaban (Xarelto) 20 MG tablet, Take 1 tablet by mouth Every Morning., Disp: 90 tablet, Rfl: 3    venlafaxine XR (EFFEXOR-XR) 75 MG 24 hr capsule, Take 3 capsules by mouth Daily. One 150 mg capsule and One 75 mg capsule once daily, Disp: , Rfl:     Family History:  Family History   Problem Relation Age of Onset    Diabetes Mother     Hypertension Mother     Heart disease Mother     Diabetes Father     Stroke Paternal Grandmother        Past Medical History:  Past Medical History:   Diagnosis Date    Atrial fibrillation     Hypertension     Sleep apnea        Past surgical History:  Past Surgical History:   Procedure Laterality Date    ANKLE SURGERY Right     CARDIAC CATHETERIZATION N/A 6/22/2021    Procedure: Left Heart Cath;  Surgeon: Marnie Ramsay MD;  Location: [facility] CATH INVASIVE LOCATION;  Service: Cardiovascular;  Laterality: N/A;    CARDIAC CATHETERIZATION  N/A 6/22/2021    Procedure: Coronary angiography;  Surgeon: Marnie Ramsay MD;  Location: [facility] INVASIVE LOCATION;  Service: Cardiovascular;  Laterality: N/A;    CARDIOVERSION  04/15/2016    Unsuccessful     SHOULDER SURGERY Left     TONSILLECTOMY  1956    TOTAL ABDOMINAL HYSTERECTOMY  10/18/2016       Social History:  Social History     Socioeconomic History    Marital status:    Tobacco Use    Smoking status: Never    Smokeless tobacco: Never   Vaping Use    Vaping Use: Never used   Substance and Sexual Activity    Alcohol use: Yes    Drug use: Never    Sexual activity: Defer       Review of Systems:  The following systems were reviewed as they relate to the cardiovascular system: Constitutional, Eyes, ENT, Cardiovascular, Respiratory, Gastrointestinal, Integumentary, Neurological, Psychiatric, Hematologic, Endocrine, Musculoskeletal, and Genitourinary. The pertinent cardiovascular findings are reported above with all other cardiovascular points within those systems being negative.    Diagnostic Study Review:     Current Electrocardiogram:    ECG 12 Lead    Date/Time: 9/19/2023 1:39 PM  Performed by: Marnie Ramsay MD  Authorized by: Marnie Ramsay MD   Comparison: compared with previous ECG   Similar to previous ECG  Rhythm: atrial fibrillation  Rate: normal  BPM: 79  Conduction: conduction normal  QRS axis: normal  Other findings: non-specific ST-T wave changes    Clinical impression: abnormal EKG          Advance Care Planning   ACP discussion was held with the patient during this visit. Patient has an advance directive in EMR which is still valid.         NOTE: The following portions of the patient's history were reviewed and updated this visit as appropriate: allergies, current medications, past family history, past medical history, past social history, past surgical history and problem list.

## 2024-03-19 ENCOUNTER — OFFICE VISIT (OUTPATIENT)
Dept: CARDIOLOGY | Facility: CLINIC | Age: 74
End: 2024-03-19
Payer: MEDICARE

## 2024-03-19 VITALS
OXYGEN SATURATION: 97 % | HEART RATE: 88 BPM | DIASTOLIC BLOOD PRESSURE: 84 MMHG | WEIGHT: 293 LBS | HEIGHT: 68 IN | BODY MASS INDEX: 44.41 KG/M2 | SYSTOLIC BLOOD PRESSURE: 134 MMHG

## 2024-03-19 DIAGNOSIS — I48.11 LONGSTANDING PERSISTENT ATRIAL FIBRILLATION: ICD-10-CM

## 2024-03-19 DIAGNOSIS — E78.2 MIXED HYPERLIPIDEMIA: ICD-10-CM

## 2024-03-19 DIAGNOSIS — I10 PRIMARY HYPERTENSION: ICD-10-CM

## 2024-03-19 DIAGNOSIS — I35.0 NONRHEUMATIC AORTIC VALVE STENOSIS: Primary | ICD-10-CM

## 2024-03-19 PROCEDURE — 99214 OFFICE O/P EST MOD 30 MIN: CPT | Performed by: INTERNAL MEDICINE

## 2024-03-19 PROCEDURE — 93000 ELECTROCARDIOGRAM COMPLETE: CPT | Performed by: INTERNAL MEDICINE

## 2024-03-19 PROCEDURE — 3079F DIAST BP 80-89 MM HG: CPT | Performed by: INTERNAL MEDICINE

## 2024-03-19 PROCEDURE — 1160F RVW MEDS BY RX/DR IN RCRD: CPT | Performed by: INTERNAL MEDICINE

## 2024-03-19 PROCEDURE — 3075F SYST BP GE 130 - 139MM HG: CPT | Performed by: INTERNAL MEDICINE

## 2024-03-19 PROCEDURE — 1159F MED LIST DOCD IN RCRD: CPT | Performed by: INTERNAL MEDICINE

## 2024-03-19 RX ORDER — VENLAFAXINE HYDROCHLORIDE 150 MG/1
CAPSULE, EXTENDED RELEASE ORAL
COMMUNITY
Start: 2024-01-17

## 2024-03-19 RX ORDER — SEMAGLUTIDE 0.68 MG/ML
INJECTION, SOLUTION SUBCUTANEOUS
COMMUNITY
Start: 2024-03-03

## 2024-03-19 NOTE — PROGRESS NOTES
Cardiology Office Visit      Encounter Date:  03/19/2024    Patient ID:   Rosanne Hutchins is a 73 y.o. female.    Reason For Followup:  Chronic atrial fibrillation  Hypertension    Brief Clinical History:  Dear Tabby Reilly MD    I had the pleasure of seeing Rosanne Hutchins today. As you are well aware, this is a 73 y.o. female past medical history that is significant for history of hypertension obesity and also history of obstructive sleep apnea currently not on CPAP and also prior history of atrial fibrillation currently on anticoagulation therapy here to establish cardiology care    Interval History:  Patient has been compliant with medical therapy with anticoagulation therapy   Currently on rate control and anticoagulation therapy with no significant symptoms from the A. fib standpoint  Denies any new cardiac symptoms  Tolerating anticoagulation therapy      Assessment & Plan    Impressions:  Chronic atrial fibrillation  Hypertension  Obesity/Body mass index is 44.01 kg/m². /Body mass index is 52 kg/m².   Obstructive sleep apnea currently not using the CPAP  Hyperlipidemia  diabetes mellitus  Normal coronaries on cardiac catheterization  Echocardiogram with normal LV systolic function  Mild  valvular aortic stenosis and mild aortic regurgitation  Venous edema bilateral lower extremities    Recommendations:  Recommend  aggressive risk factor modification regular exercise and weight loss  Continue Toprol-XL  Continue anticoagulation therapy with Xarelto  Risk benefits and alternatives for long-term oral anticoagulation reviewed and discussed with the patient  Recent cardiac catheterization with no obstructive coronary artery disease  Continued aggressive risk factor modification and medical management  Home blood pressure monitoring  No need for aspirin just continue Xarelto for anticoagulation therapy  Continue current medical therapy with Toprol-XL 25 mg p.o. once a day Xarelto 20 mg p.o. once a day  Benefits  "and alternatives for anticoagulation reviewed and discussed with patient  Labs with primary care physician office  Follow-up in office in 6 months  Labs with primary care physician office  From cardiac standpoint patient is stable to try the medication  Risk benefits and alternatives reviewed and discussed the patient  Results of the echocardiogram reviewed and discussed with the patient  Prior work-up and labs reviewed and discussed with patient        Vitals:  Vitals:    03/19/24 1330   BP: 134/84   Pulse: 88   SpO2: 97%   Weight: (!) 156 kg (343 lb)   Height: 172.7 cm (68\")       Physical Exam:    General: Alert, cooperative, no distress, appears stated age  Head:  Normocephalic, atraumatic, mucous membranes moist  Eyes:  Conjunctiva/corneas clear, EOM's intact     Neck:  Supple,  no adenopathy;      Lungs: Clear to auscultation bilaterally, no wheezes rhonchi rales are noted  Chest wall: No tenderness  Heart::  Irregular rate and rhythm, S1 and S2 normal, no murmur, rub or gallop  Abdomen: Soft, non-tender, nondistended bowel sounds active  Extremities: No cyanosis, clubbing, or edema  Pulses: 2+ and symmetric all extremities  Skin:  No rashes or lesions  Neuro/psych: A&O x3. CN II through XII are grossly intact with appropriate affect              Lab Results   Component Value Date    GLUCOSE 119 (H) 06/22/2021    BUN 15 06/22/2021    CREATININE 1.09 (H) 06/22/2021    BCR 13.8 06/22/2021    K 3.8 06/22/2021    CO2 24.0 06/22/2021    CALCIUM 8.8 06/22/2021     Results for orders placed during the hospital encounter of 04/03/23    Adult Transthoracic Echo Complete W/ Cont if Necessary Per Protocol    Interpretation Summary    Left ventricular systolic function is normal. Calculated left ventricular EF = 56.3% Left ventricular ejection fraction appears to be 56 - 60%.    Left ventricular wall thickness is consistent with mild concentric hypertrophy.    Left ventricular diastolic function is consistent with (grade " II w/high LAP) pseudonormalization.    The right atrial cavity is mildly  dilated.    There is calcification of the aortic valve mainly affecting the left coronary and right coronary cusp(s).    Estimated right ventricular systolic pressure from tricuspid regurgitation is normal (<35 mmHg).     Results for orders placed during the hospital encounter of 06/22/21    Cardiac Catheterization/Vascular Study    Narrative  Table formatting from the original result was not included.  Cardiac Catheterization Operative Report    Rosanne Hutchins  [id number]  6/22/2021  @PCP@    She underwent cardiac catheterization.    Indications for the procedure include: abnormal stress test.    Procedure Details:  The risks, benefits, complications, treatment options, and expected outcomes were discussed with the patient. The patient and/or family concurred with the proposed plan, giving informed consent.    After informed consent the patient was brought to the cath lab after appropriate IV hydration was begun and oral premedication was given. She was further sedated with fentanyl. She was prepped and draped in the usual manner. Using the modified Seldinger access technique, a 6 Sloven[place] sheath was placed in the femoral artery. A left heart catheterization with coronary arteriography was performed. Findings are discussed below.    After the procedure was completed, sedation was stopped and the sheaths and catheters were all removed. Hemostasis was achieved per established hospital protocols.    Conscious sedation:  Conscious sedation was performed according to protocol.  I supervised and directed an independent trained observer with the assistance of monitoring the patient's level of consciousness and physiologic status throughout the procedure.  Intraoperative service time was 30 minutes.    Findings:    Hemodynamics  Central artery pressure systolic 110 and diastolic 60 mmHg  Left Main  left main is angiographically normal, left main  "bifurcates into left and descending and left circumflex arteries  RCA  large dominant vessel angiographically normal.  Coronary artery provides a moderate size PDA and PLB branches that are angiographically normal.  LAD  large-caliber vessel provides mild moderate-sized diagonal branch both LAD and diagonal branch did not have any significant obstructive coronary artery disease.  LAD also provides a second diagonal branch that is angiographically normal but small in size.  LAD has a long segment of intramyocardial bridging involving the midportion.  Circ  large-caliber vessel angiographically normal provides 1 moderate to large size marginal branch that is also angiographically normal  LV  not done  Coronary Dominance  right coronary artery    Estimated Blood Loss:  Minimal    Specimens: None    Complications:  None; patient tolerated the procedure well.    Disposition: PACU - hemodynamically stable.    Condition: stable    Impressions:  Normal coronaries    Recommendations:  Work-up for noncardiac etiology for patient's symptoms  Test results were reviewed and discussed with the patient and family     No results found for: \"CHOL\", \"CHLPL\", \"TRIG\", \"HDL\", \"LDL\", \"LDLDIRECT\"   Results for orders placed during the hospital encounter of 06/16/21    Stress Test With Myocardial Perfusion One Day    Interpretation Summary  · Abnormal medical perfusion study  · Large area of severe intensity perfusion defect involving the anterior anteroapical wall and also moderate size reversible ischemia involving the inferior wall  · Left ventricular ejection fraction is normal. (Calculated EF = 51%).  · Impressions are consistent with a high risk study.  · Clinical correlation is recommended   Results for orders placed in visit on 04/15/16    CARDIOVASCULAR STUDIES - CONVERTED    Narrative  Cardioversion      Imported By: Roxanne Weber 4/25/2016 10:45:41 " AM    _____________________________________________________________________    External Attachment:    Type: Image  Comment:  External Document      Signed before import by Wade Hunter MD  Filed automatically on 04/25/2016 at 10:46 AM  ________________________________________________________________________      Disclaimer: Converted Note message may not contain all data elements that existed in the [facility] source system. Please see [employer] [facility] for the original note details.           Objective:          Allergies:  Allergies   Allergen Reactions    Augmentin [Amoxicillin-Pot Clavulanate] GI Intolerance       Medication Review:     Current Outpatient Medications:     metFORMIN ER (GLUCOPHAGE-XR) 500 MG 24 hr tablet, Take 1 tablet by mouth Daily With Breakfast., Disp: , Rfl:     metoprolol succinate XL (TOPROL-XL) 25 MG 24 hr tablet, Take 1 tablet by mouth Daily., Disp: 90 tablet, Rfl: 3    Ozempic, 0.25 or 0.5 MG/DOSE, 2 MG/3ML solution pen-injector, , Disp: , Rfl:     rivaroxaban (Xarelto) 20 MG tablet, Take 1 tablet by mouth Every Morning., Disp: 90 tablet, Rfl: 3    venlafaxine XR (EFFEXOR-XR) 150 MG 24 hr capsule, , Disp: , Rfl:     venlafaxine XR (EFFEXOR-XR) 75 MG 24 hr capsule, Take 3 capsules by mouth Daily. One 150 mg capsule and One 75 mg capsule once daily, Disp: , Rfl:     Family History:  Family History   Problem Relation Age of Onset    Diabetes Mother     Hypertension Mother     Heart disease Mother     Diabetes Father     Stroke Paternal Grandmother        Past Medical History:  Past Medical History:   Diagnosis Date    Atrial fibrillation     Hypertension     Sleep apnea        Past surgical History:  Past Surgical History:   Procedure Laterality Date    ANKLE SURGERY Right     CARDIAC CATHETERIZATION N/A 6/22/2021    Procedure: Left Heart Cath;  Surgeon: Marnie Ramsay MD;  Location: [facility] CATH INVASIVE LOCATION;  Service: Cardiovascular;  Laterality: N/A;    CARDIAC  CATHETERIZATION N/A 6/22/2021    Procedure: Coronary angiography;  Surgeon: Marnie Ramsay MD;  Location: [facility] CATH INVASIVE LOCATION;  Service: Cardiovascular;  Laterality: N/A;    CARDIOVERSION  04/15/2016    Unsuccessful     SHOULDER SURGERY Left     TONSILLECTOMY  1956    TOTAL ABDOMINAL HYSTERECTOMY  10/18/2016       Social History:  Social History     Socioeconomic History    Marital status:    Tobacco Use    Smoking status: Never     Passive exposure: Never    Smokeless tobacco: Never   Vaping Use    Vaping status: Never Used   Substance and Sexual Activity    Alcohol use: Yes    Drug use: Never    Sexual activity: Defer       Review of Systems:  The following systems were reviewed as they relate to the cardiovascular system: Constitutional, Eyes, ENT, Cardiovascular, Respiratory, Gastrointestinal, Integumentary, Neurological, Psychiatric, Hematologic, Endocrine, Musculoskeletal, and Genitourinary. The pertinent cardiovascular findings are reported above with all other cardiovascular points within those systems being negative.    Diagnostic Study Review:     Current Electrocardiogram:    ECG 12 Lead    Date/Time: 3/19/2024 1:56 PM  Performed by: Marnie Ramsay MD    Authorized by: Marnie Ramsay MD  Comparison: compared with previous ECG   Similar to previous ECG  Rhythm: atrial fibrillation  Rate: normal  BPM: 75  Conduction: conduction normal  QRS axis: normal  Other findings: non-specific ST-T wave changes                NOTE: The following portions of the patient's history were reviewed and updated this visit as appropriate: allergies, current medications, past family history, past medical history, past social history, past surgical history and problem list.

## 2024-04-02 ENCOUNTER — TELEPHONE (OUTPATIENT)
Dept: CARDIOLOGY | Facility: CLINIC | Age: 74
End: 2024-04-02
Payer: MEDICARE

## 2024-04-02 NOTE — TELEPHONE ENCOUNTER
Form received for Eye surgery clearance- scheduled for 6/26/24. They do not require holding Xarelto prior to procedure. Will give to Dr Ramsay to review 4/10/24.

## 2024-04-16 RX ORDER — RIVAROXABAN 20 MG/1
20 TABLET, FILM COATED ORAL EVERY MORNING
Qty: 90 TABLET | Refills: 3 | Status: SHIPPED | OUTPATIENT
Start: 2024-04-16

## 2024-07-15 RX ORDER — METOPROLOL SUCCINATE 25 MG/1
25 TABLET, EXTENDED RELEASE ORAL DAILY
Qty: 90 TABLET | Refills: 3 | Status: SHIPPED | OUTPATIENT
Start: 2024-07-15

## 2024-09-19 ENCOUNTER — TELEPHONE (OUTPATIENT)
Dept: CARDIOLOGY | Facility: CLINIC | Age: 74
End: 2024-09-19
Payer: MEDICARE

## 2025-04-14 RX ORDER — RIVAROXABAN 20 MG/1
20 TABLET, FILM COATED ORAL EVERY MORNING
Qty: 90 TABLET | Refills: 3 | OUTPATIENT
Start: 2025-04-14

## 2025-04-24 ENCOUNTER — OFFICE VISIT (OUTPATIENT)
Dept: CARDIOLOGY | Facility: CLINIC | Age: 75
End: 2025-04-24
Payer: MEDICARE

## 2025-04-24 VITALS
SYSTOLIC BLOOD PRESSURE: 119 MMHG | HEART RATE: 81 BPM | DIASTOLIC BLOOD PRESSURE: 78 MMHG | OXYGEN SATURATION: 96 % | HEIGHT: 68 IN | WEIGHT: 293 LBS | BODY MASS INDEX: 44.41 KG/M2

## 2025-04-24 DIAGNOSIS — R60.0 BILATERAL LEG EDEMA: ICD-10-CM

## 2025-04-24 DIAGNOSIS — R06.09 DYSPNEA ON EXERTION: ICD-10-CM

## 2025-04-24 DIAGNOSIS — I48.11 LONGSTANDING PERSISTENT ATRIAL FIBRILLATION: ICD-10-CM

## 2025-04-24 DIAGNOSIS — R53.83 FATIGUE, UNSPECIFIED TYPE: Primary | ICD-10-CM

## 2025-04-24 DIAGNOSIS — Z86.79: ICD-10-CM

## 2025-04-24 RX ORDER — FUROSEMIDE 40 MG/1
40 TABLET ORAL DAILY
Qty: 10 TABLET | Refills: 0 | Status: SHIPPED | OUTPATIENT
Start: 2025-04-24 | End: 2025-04-27

## 2025-04-24 NOTE — PROGRESS NOTES
[facility] CARDIOLOGY      REASON FOR FOLLOW-UP:  Shortness of breath  Fatigue  Chest discomfort          Chief Complaint   Patient presents with    Heart Problem         Dear Tabby Cast MD        History of Present Illness   It was my pleasure to see Rosanne Hutchins in the office today.  She is a 74-year-old female with no known history of coronary occlusive disease per prior heart cath demonstrating normal coronaries.  She does have history of permanent atrial fibrillation, primary hypertension, obstructive sleep apnea-untreated, history of hyperlipidemia, diabetes mellitus 2, morbid exogenous obesity with BMI 50.33.  The patient presents today in acute office visit for symptoms of shortness of breath and fatigue.    Rosanne tells me today that she has had significantly worsening fatigue over the past 6-8 weeks with associated shortness of breath with activity.  She denies any orthopnea.  She reports some atypical discomfort in her right upper chest described as an aching with intermittent heaviness.  She had 1 brief episode of cough with green phlegm production, but denies any fevers or chills.  She has chronic lower extremity edema which she states is about at baseline.  She denies dizziness, lightheadedness, near syncopal or syncopal episodes.  No GI complaints.      ASSESSMENT:  Shortness of breath with activity  Worsening fatigue  Chronic permanent atrial fibrillation  Elevated chads vascular score  Coagulopathy secondary to Xarelto  Morbid exogenous obesity  Diabetes mellitus 2  Obstructive sleep apnea-untreated    PLAN:  The patient appears to be retaining fluid.  I will send prescription for Lasix 40 mg x 3 days.  She should decrease sodium intake.  I will check 2D echocardiogram to evaluate for any worsening of her diastolic or valvular heart disease.  She should present to the emergency department for any worsening of symptoms.  I will see her back after testing.        CHF  Guideline Directed Medical Therapy  Beta Blocker:   ARNI/ACE/ARB:   SGLT 2 inhibitors:   Diuretics:   Aldosterone Antagonist:   Vasodilators & Nitrates:       Diagnoses and all orders for this visit:    1. Fatigue, unspecified type (Primary)  -     Adult Transthoracic Echo Complete W/ Cont if Necessary Per Protocol; Future    2. Dyspnea on exertion  -     Adult Transthoracic Echo Complete W/ Cont if Necessary Per Protocol; Future    3. Longstanding persistent atrial fibrillation  -     Adult Transthoracic Echo Complete W/ Cont if Necessary Per Protocol; Future    4. H/O mitral valve insufficiency  -     Adult Transthoracic Echo Complete W/ Cont if Necessary Per Protocol; Future    5. Bilateral leg edema  -     Adult Transthoracic Echo Complete W/ Cont if Necessary Per Protocol; Future    Other orders  -     furosemide (LASIX) 40 MG tablet; Take 1 tablet by mouth Daily for 3 days.  Dispense: 10 tablet; Refill: 0          The following portions of the patient's history were reviewed and updated as appropriate: allergies, current medications, past family history, past medical history, past social history, past surgical history, and problem list.    REVIEW OF SYSTEMS:    Review of Systems   Constitutional: Positive for malaise/fatigue.   Cardiovascular:  Positive for dyspnea on exertion and leg swelling.   All other systems reviewed and are negative.      Vitals:    04/24/25 1115   BP: 119/78   Pulse: 81   SpO2: 96%         PHYSICAL EXAM:    General: Alert, cooperative, no distress, appears stated age  Head:  Normocephalic, atraumatic, mucous membranes moist  Eyes:  Conjunctiva/corneas clear, EOM's intact     Neck:  Supple,  no JVD or bruit     Lungs: Slight crackles in bases  Chest wall: No tenderness  Musculoskeletal:   Ambulates freely without assistance  Heart::  Regular rate and rhythm, S1 and S2 normal, no murmur, rub or gallop  Abdomen: Morbidly obese  Extremities: 1+ edema to both lower extremities  Pulses: 2+  and symmetric all extremities  Skin:  No rashes or lesions  Neuro/psych: A&O x3. CN II through XII are grossly intact with appropriate affect        Past Medical History:   Diagnosis Date    Atrial fibrillation     Hypertension     Sleep apnea        Past Surgical History:   Procedure Laterality Date    ANKLE SURGERY Right     CARDIAC CATHETERIZATION N/A 6/22/2021    Procedure: Left Heart Cath;  Surgeon: Marnie Ramsay MD;  Location:  [facility] CATH INVASIVE LOCATION;  Service: Cardiovascular;  Laterality: N/A;    CARDIAC CATHETERIZATION N/A 6/22/2021    Procedure: Coronary angiography;  Surgeon: Marnie Ramsay MD;  Location:  [facility] CATH INVASIVE LOCATION;  Service: Cardiovascular;  Laterality: N/A;    CARDIOVERSION  04/15/2016    Unsuccessful     SHOULDER SURGERY Left     TONSILLECTOMY  1956    TOTAL ABDOMINAL HYSTERECTOMY  10/18/2016         Current Outpatient Medications:     metFORMIN ER (GLUCOPHAGE-XR) 500 MG 24 hr tablet, Take 1 tablet by mouth Daily With Breakfast., Disp: , Rfl:     metoprolol succinate XL (TOPROL-XL) 25 MG 24 hr tablet, TAKE 1 TABLET BY MOUTH DAILY, Disp: 90 tablet, Rfl: 3    Ozempic, 0.25 or 0.5 MG/DOSE, 2 MG/3ML solution pen-injector, , Disp: , Rfl:     venlafaxine XR (EFFEXOR-XR) 150 MG 24 hr capsule, , Disp: , Rfl:     venlafaxine XR (EFFEXOR-XR) 75 MG 24 hr capsule, Take 3 capsules by mouth Daily. One 150 mg capsule and One 75 mg capsule once daily, Disp: , Rfl:     Xarelto 20 MG tablet, TAKE ONE TABLET BY MOUTH EVERY MORNING, Disp: 90 tablet, Rfl: 3    furosemide (LASIX) 40 MG tablet, Take 1 tablet by mouth Daily for 3 days., Disp: 10 tablet, Rfl: 0    Allergies   Allergen Reactions    Augmentin [Amoxicillin-Pot Clavulanate] GI Intolerance       Family History   Problem Relation Age of Onset    Diabetes Mother     Hypertension Mother     Heart disease Mother     Diabetes Father     Stroke Paternal Grandmother        Social History     Tobacco Use    Smoking status: Never  "    Passive exposure: Never    Smokeless tobacco: Never   Substance Use Topics    Alcohol use: Yes           Current Electrocardiogram:    ECG 12 Lead    Date/Time: 4/24/2025 3:07 PM  Performed by: Nasrin Cameron APRN    Authorized by: Nasrin Cameron APRN  Comparison: compared with previous ECG from 3/19/2024  Rhythm: atrial fibrillation  BPM: 81  Other findings: non-specific ST-T wave changes              EMR Dragon/Transcription:   \"Dictated utilizing Dragon dictation\".     Copied text in this note has been reviewed by me and is accurate as of 04/24/25.    "

## 2025-04-28 ENCOUNTER — HOSPITAL ENCOUNTER (OUTPATIENT)
Dept: CARDIOLOGY | Facility: HOSPITAL | Age: 75
Discharge: HOME | End: 2025-04-28
Admitting: NURSE PRACTITIONER
Payer: MEDICARE

## 2025-04-28 VITALS
DIASTOLIC BLOOD PRESSURE: 67 MMHG | SYSTOLIC BLOOD PRESSURE: 132 MMHG | BODY MASS INDEX: 44.41 KG/M2 | HEIGHT: 68 IN | WEIGHT: 293 LBS

## 2025-04-28 DIAGNOSIS — I48.11 LONGSTANDING PERSISTENT ATRIAL FIBRILLATION: ICD-10-CM

## 2025-04-28 DIAGNOSIS — R60.0 BILATERAL LEG EDEMA: ICD-10-CM

## 2025-04-28 DIAGNOSIS — Z86.79: ICD-10-CM

## 2025-04-28 DIAGNOSIS — R53.83 FATIGUE, UNSPECIFIED TYPE: ICD-10-CM

## 2025-04-28 DIAGNOSIS — R06.09 DYSPNEA ON EXERTION: ICD-10-CM

## 2025-04-28 LAB
AORTIC DIMENSIONLESS INDEX: 0.73 (DI)
AV MEAN PRESS GRAD SYS DOP V1V2: 3.6 MMHG
AV VMAX SYS DOP: 132.8 CM/SEC
BH CV ECHO MEAS - AI P1/2T: 741 MSEC
BH CV ECHO MEAS - AO MAX PG: 7.1 MMHG
BH CV ECHO MEAS - AO ROOT DIAM: 3.5 CM
BH CV ECHO MEAS - AO V2 VTI: 29.3 CM
BH CV ECHO MEAS - AVA(I,D): 2.35 CM2
BH CV ECHO MEAS - EDV(CUBED): 125 ML
BH CV ECHO MEAS - EDV(MOD-SP2): 78.5 ML
BH CV ECHO MEAS - EDV(MOD-SP4): 109.8 ML
BH CV ECHO MEAS - EF(MOD-SP2): 55.4 %
BH CV ECHO MEAS - EF(MOD-SP4): 56.6 %
BH CV ECHO MEAS - ESV(CUBED): 32.4 ML
BH CV ECHO MEAS - ESV(MOD-SP2): 35 ML
BH CV ECHO MEAS - ESV(MOD-SP4): 47.7 ML
BH CV ECHO MEAS - FS: 36.2 %
BH CV ECHO MEAS - IVS/LVPW: 0.88 CM
BH CV ECHO MEAS - IVSD: 0.99 CM
BH CV ECHO MEAS - LA DIMENSION: 4.8 CM
BH CV ECHO MEAS - LAT PEAK E' VEL: 15 CM/SEC
BH CV ECHO MEAS - LV DIASTOLIC VOL/BSA (35-75): 43.4 CM2
BH CV ECHO MEAS - LV MASS(C)D: 197.6 GRAMS
BH CV ECHO MEAS - LV MAX PG: 4.3 MMHG
BH CV ECHO MEAS - LV MEAN PG: 2.18 MMHG
BH CV ECHO MEAS - LV SYSTOLIC VOL/BSA (12-30): 18.8 CM2
BH CV ECHO MEAS - LV V1 MAX: 104 CM/SEC
BH CV ECHO MEAS - LV V1 VTI: 21.5 CM
BH CV ECHO MEAS - LVIDD: 5 CM
BH CV ECHO MEAS - LVIDS: 3.2 CM
BH CV ECHO MEAS - LVOT AREA: 3.2 CM2
BH CV ECHO MEAS - LVOT DIAM: 2.02 CM
BH CV ECHO MEAS - LVPWD: 1.13 CM
BH CV ECHO MEAS - MED PEAK E' VEL: 9.6 CM/SEC
BH CV ECHO MEAS - MR MAX PG: 72.2 MMHG
BH CV ECHO MEAS - MR MAX VEL: 424.8 CM/SEC
BH CV ECHO MEAS - MV A DUR: 0.06 SEC
BH CV ECHO MEAS - MV A MAX VEL: 53.9 CM/SEC
BH CV ECHO MEAS - MV DEC SLOPE: 626.4 CM/SEC2
BH CV ECHO MEAS - MV DEC TIME: 0.13 SEC
BH CV ECHO MEAS - MV E MAX VEL: 80.1 CM/SEC
BH CV ECHO MEAS - MV E/A: 1.49
BH CV ECHO MEAS - MV MAX PG: 3.7 MMHG
BH CV ECHO MEAS - MV MEAN PG: 1.67 MMHG
BH CV ECHO MEAS - MV V2 VTI: 15.9 CM
BH CV ECHO MEAS - MVA(VTI): 4.3 CM2
BH CV ECHO MEAS - PA ACC TIME: 0.1 SEC
BH CV ECHO MEAS - PA V2 MAX: 120.4 CM/SEC
BH CV ECHO MEAS - RAP SYSTOLE: 8 MMHG
BH CV ECHO MEAS - RV MAX PG: 2.6 MMHG
BH CV ECHO MEAS - RV V1 MAX: 81.3 CM/SEC
BH CV ECHO MEAS - RV V1 VTI: 16.3 CM
BH CV ECHO MEAS - RVDD: 3 CM
BH CV ECHO MEAS - RVSP: 42.4 MMHG
BH CV ECHO MEAS - SV(LVOT): 68.8 ML
BH CV ECHO MEAS - SV(MOD-SP2): 43.5 ML
BH CV ECHO MEAS - SV(MOD-SP4): 62.1 ML
BH CV ECHO MEAS - SVI(LVOT): 27.2 ML/M2
BH CV ECHO MEAS - SVI(MOD-SP2): 17.2 ML/M2
BH CV ECHO MEAS - SVI(MOD-SP4): 24.5 ML/M2
BH CV ECHO MEAS - TAPSE (>1.6): 2.02 CM
BH CV ECHO MEAS - TR MAX PG: 34.4 MMHG
BH CV ECHO MEAS - TR MAX VEL: 293.1 CM/SEC
BH CV ECHO MEASUREMENTS AVERAGE E/E' RATIO: 6.51
LV EF BIPLANE MOD: 55 %

## 2025-04-28 PROCEDURE — 93306 TTE W/DOPPLER COMPLETE: CPT

## 2025-04-28 PROCEDURE — 93306 TTE W/DOPPLER COMPLETE: CPT | Performed by: INTERNAL MEDICINE

## 2025-05-05 RX ORDER — FUROSEMIDE 40 MG/1
40 TABLET ORAL DAILY
Qty: 10 TABLET | Refills: 0 | OUTPATIENT
Start: 2025-05-05 | End: 2025-05-08

## 2025-06-16 ENCOUNTER — TELEPHONE (OUTPATIENT)
Dept: CARDIOLOGY | Facility: CLINIC | Age: 75
End: 2025-06-16

## 2025-06-16 NOTE — TELEPHONE ENCOUNTER
Caller: Rosanne Hutchins    Relationship to patient: Self    Best call back number: [phone]     Patient is needing: PT CALLING TO GET RESULTS FROM ECHO DONE ON 04.28.2025. CALL TO LET PT KNOW RESULTS.

## 2025-06-17 ENCOUNTER — TELEPHONE (OUTPATIENT)
Dept: CARDIOLOGY | Facility: CLINIC | Age: 75
End: 2025-06-17
Payer: MEDICARE

## 2025-06-17 NOTE — TELEPHONE ENCOUNTER
Called pt, unable to reach, left v/m      OKAY FOR HUB TO RELEASE INFO      Marnie Ramsay MD [place], Tracy G, MA  Echocardiogram looks normal          Previous Messages       ----- Message -----  From: Basilia Lay MA  Sent: 6/16/2025   2:35 PM EDT  To: Marnie Ramsay MD    Caller: Rosanne Hutchins     Relationship to patient: Self     Best call back number: [phone]     Patient is needing: PT CALLING TO GET RESULTS FROM ECHO DONE ON 04.28.2025. CALL TO LET PT KNOW RESULTS.

## 2025-07-17 RX ORDER — METOPROLOL SUCCINATE 25 MG/1
25 TABLET, EXTENDED RELEASE ORAL DAILY
Qty: 90 TABLET | Refills: 3 | Status: SHIPPED | OUTPATIENT
Start: 2025-07-17

## (undated) DEVICE — PK TRY HEART CATH 50

## (undated) DEVICE — GW PTFE EMERALD HEPCOAT FC J TIP STD .035 3MM 150CM

## (undated) DEVICE — CATH DIAG IMPULSE FL5 6F 100CM

## (undated) DEVICE — KT DYEVERT PLS EZ W/SMART SYR FOR HI VISC CONTRST DISP

## (undated) DEVICE — ST ACC MICROPUNCTURE STFF/CANN PLAT/TP 4F 21G 40CM

## (undated) DEVICE — CATH DIAG IMPULSE FR4 6F 100CM

## (undated) DEVICE — CATH DIAG IMPULSE PIG .056 6F 110CM

## (undated) DEVICE — PINNACLE INTRODUCER SHEATH: Brand: PINNACLE

## (undated) DEVICE — CATH DIAG IMPULSE FL4 6F 100CM

## (undated) DEVICE — ANGIO-SEAL VIP VASCULAR CLOSURE DEVICE: Brand: ANGIO-SEAL